# Patient Record
Sex: MALE | Race: WHITE | Employment: FULL TIME | URBAN - METROPOLITAN AREA
[De-identification: names, ages, dates, MRNs, and addresses within clinical notes are randomized per-mention and may not be internally consistent; named-entity substitution may affect disease eponyms.]

---

## 2020-03-22 ENCOUNTER — HOSPITAL ENCOUNTER (EMERGENCY)
Facility: CLINIC | Age: 65
Discharge: HOME | End: 2020-03-22
Attending: EMERGENCY MEDICINE
Payer: MEDICARE

## 2020-03-22 ENCOUNTER — APPOINTMENT (OUTPATIENT)
Dept: CT IMAGING | Facility: CLINIC | Age: 65
End: 2020-03-22
Payer: MEDICARE

## 2020-03-22 VITALS
HEIGHT: 71 IN | TEMPERATURE: 97.9 F | RESPIRATION RATE: 16 BRPM | SYSTOLIC BLOOD PRESSURE: 150 MMHG | HEART RATE: 77 BPM | DIASTOLIC BLOOD PRESSURE: 80 MMHG | WEIGHT: 260 LBS | BODY MASS INDEX: 36.4 KG/M2 | OXYGEN SATURATION: 99 %

## 2020-03-22 LAB
-: ABNORMAL
ABSOLUTE EOS #: 0.3 K/UL (ref 0–0.4)
ABSOLUTE IMMATURE GRANULOCYTE: ABNORMAL K/UL (ref 0–0.3)
ABSOLUTE LYMPH #: 2.7 K/UL (ref 1–4.8)
ABSOLUTE MONO #: 0.9 K/UL (ref 0.1–1.2)
ALBUMIN SERPL-MCNC: 3.9 G/DL (ref 3.5–5.2)
ALBUMIN/GLOBULIN RATIO: 1.1 (ref 1–2.5)
ALP BLD-CCNC: 82 U/L (ref 40–129)
ALT SERPL-CCNC: 22 U/L (ref 5–41)
AMORPHOUS: ABNORMAL
ANION GAP SERPL CALCULATED.3IONS-SCNC: 14 MMOL/L (ref 9–17)
AST SERPL-CCNC: 20 U/L
BACTERIA: ABNORMAL
BASOPHILS # BLD: 1 % (ref 0–2)
BASOPHILS ABSOLUTE: 0.1 K/UL (ref 0–0.2)
BILIRUB SERPL-MCNC: 0.4 MG/DL (ref 0.3–1.2)
BILIRUBIN URINE: NEGATIVE
BUN BLDV-MCNC: 15 MG/DL (ref 8–23)
BUN/CREAT BLD: ABNORMAL (ref 9–20)
CALCIUM SERPL-MCNC: 9.8 MG/DL (ref 8.6–10.4)
CASTS UA: ABNORMAL /LPF (ref 0–2)
CHLORIDE BLD-SCNC: 102 MMOL/L (ref 98–107)
CO2: 26 MMOL/L (ref 20–31)
COLOR: YELLOW
COMMENT UA: ABNORMAL
CREAT SERPL-MCNC: 0.9 MG/DL (ref 0.7–1.2)
CRYSTALS, UA: ABNORMAL /HPF
DIFFERENTIAL TYPE: ABNORMAL
EOSINOPHILS RELATIVE PERCENT: 3 % (ref 1–4)
EPITHELIAL CELLS UA: ABNORMAL /HPF (ref 0–5)
GFR AFRICAN AMERICAN: >60 ML/MIN
GFR NON-AFRICAN AMERICAN: >60 ML/MIN
GFR SERPL CREATININE-BSD FRML MDRD: ABNORMAL ML/MIN/{1.73_M2}
GFR SERPL CREATININE-BSD FRML MDRD: ABNORMAL ML/MIN/{1.73_M2}
GLUCOSE BLD-MCNC: 102 MG/DL (ref 70–99)
GLUCOSE URINE: NEGATIVE
HCT VFR BLD CALC: 43.1 % (ref 41–53)
HEMOGLOBIN: 13.9 G/DL (ref 13.5–17.5)
IMMATURE GRANULOCYTES: ABNORMAL %
KETONES, URINE: NEGATIVE
LEUKOCYTE ESTERASE, URINE: NEGATIVE
LIPASE: 37 U/L (ref 13–60)
LYMPHOCYTES # BLD: 24 % (ref 24–44)
MCH RBC QN AUTO: 28.6 PG (ref 26–34)
MCHC RBC AUTO-ENTMCNC: 32.1 G/DL (ref 31–37)
MCV RBC AUTO: 88.9 FL (ref 80–100)
MONOCYTES # BLD: 8 % (ref 2–11)
MUCUS: ABNORMAL
NITRITE, URINE: NEGATIVE
NRBC AUTOMATED: ABNORMAL PER 100 WBC
OTHER OBSERVATIONS UA: ABNORMAL
PDW BLD-RTO: 14.1 % (ref 12.5–15.4)
PH UA: 6 (ref 5–8)
PLATELET # BLD: 205 K/UL (ref 140–450)
PLATELET ESTIMATE: ABNORMAL
PMV BLD AUTO: 9.4 FL (ref 6–12)
POTASSIUM SERPL-SCNC: 4 MMOL/L (ref 3.7–5.3)
PROTEIN UA: ABNORMAL
RBC # BLD: 4.85 M/UL (ref 4.5–5.9)
RBC # BLD: ABNORMAL 10*6/UL
RBC UA: ABNORMAL /HPF (ref 0–2)
RENAL EPITHELIAL, UA: ABNORMAL /HPF
SEG NEUTROPHILS: 64 % (ref 36–66)
SEGMENTED NEUTROPHILS ABSOLUTE COUNT: 7.3 K/UL (ref 1.8–7.7)
SODIUM BLD-SCNC: 142 MMOL/L (ref 135–144)
SPECIFIC GRAVITY UA: 1.02 (ref 1–1.03)
TOTAL PROTEIN: 7.4 G/DL (ref 6.4–8.3)
TRICHOMONAS: ABNORMAL
TURBIDITY: CLEAR
URINE HGB: ABNORMAL
UROBILINOGEN, URINE: NORMAL
WBC # BLD: 11.2 K/UL (ref 3.5–11)
WBC # BLD: ABNORMAL 10*3/UL
WBC UA: ABNORMAL /HPF (ref 0–5)
YEAST: ABNORMAL

## 2020-03-22 PROCEDURE — 85025 COMPLETE CBC W/AUTO DIFF WBC: CPT

## 2020-03-22 PROCEDURE — 83690 ASSAY OF LIPASE: CPT

## 2020-03-22 PROCEDURE — 6360000002 HC RX W HCPCS: Performed by: EMERGENCY MEDICINE

## 2020-03-22 PROCEDURE — 36415 COLL VENOUS BLD VENIPUNCTURE: CPT

## 2020-03-22 PROCEDURE — 81001 URINALYSIS AUTO W/SCOPE: CPT

## 2020-03-22 PROCEDURE — 96374 THER/PROPH/DIAG INJ IV PUSH: CPT

## 2020-03-22 PROCEDURE — 80053 COMPREHEN METABOLIC PANEL: CPT

## 2020-03-22 PROCEDURE — 99284 EMERGENCY DEPT VISIT MOD MDM: CPT

## 2020-03-22 PROCEDURE — 74176 CT ABD & PELVIS W/O CONTRAST: CPT

## 2020-03-22 RX ORDER — TAMSULOSIN HYDROCHLORIDE 0.4 MG/1
0.4 CAPSULE ORAL DAILY
Qty: 30 CAPSULE | Refills: 0 | Status: SHIPPED | OUTPATIENT
Start: 2020-03-22 | End: 2020-10-19

## 2020-03-22 RX ORDER — KETOROLAC TROMETHAMINE 15 MG/ML
15 INJECTION, SOLUTION INTRAMUSCULAR; INTRAVENOUS ONCE
Status: COMPLETED | OUTPATIENT
Start: 2020-03-22 | End: 2020-03-22

## 2020-03-22 RX ORDER — ONDANSETRON 4 MG/1
4 TABLET, ORALLY DISINTEGRATING ORAL EVERY 8 HOURS PRN
Qty: 12 TABLET | Refills: 0 | Status: SHIPPED | OUTPATIENT
Start: 2020-03-22 | End: 2020-10-19

## 2020-03-22 RX ORDER — HYDROCODONE BITARTRATE AND ACETAMINOPHEN 5; 325 MG/1; MG/1
1 TABLET ORAL EVERY 6 HOURS PRN
Qty: 12 TABLET | Refills: 0 | Status: SHIPPED | OUTPATIENT
Start: 2020-03-22 | End: 2020-03-25

## 2020-03-22 RX ADMIN — KETOROLAC TROMETHAMINE 15 MG: 15 INJECTION, SOLUTION INTRAMUSCULAR; INTRAVENOUS at 17:34

## 2020-03-22 ASSESSMENT — PAIN SCALES - GENERAL
PAINLEVEL_OUTOF10: 4
PAINLEVEL_OUTOF10: 4

## 2020-03-22 ASSESSMENT — PAIN DESCRIPTION - ORIENTATION: ORIENTATION: LEFT;LOWER

## 2020-03-22 ASSESSMENT — PAIN DESCRIPTION - ONSET: ONSET: GRADUAL

## 2020-03-22 ASSESSMENT — PAIN DESCRIPTION - PAIN TYPE: TYPE: ACUTE PAIN

## 2020-03-22 ASSESSMENT — PAIN DESCRIPTION - DIRECTION: RADIATING_TOWARDS: BACK

## 2020-03-22 ASSESSMENT — PAIN DESCRIPTION - DESCRIPTORS: DESCRIPTORS: TENDER

## 2020-03-22 ASSESSMENT — PAIN DESCRIPTION - LOCATION: LOCATION: ABDOMEN

## 2020-03-22 ASSESSMENT — PAIN DESCRIPTION - PROGRESSION: CLINICAL_PROGRESSION: GRADUALLY WORSENING

## 2020-03-22 ASSESSMENT — PAIN DESCRIPTION - FREQUENCY: FREQUENCY: CONTINUOUS

## 2020-03-22 NOTE — ED PROVIDER NOTES
[facility]  [address] [facility]  Phone: [phone]      Pt Name: Elizabeth Jackson  TJF:[id number]  [place] 1955  Date of evaluation: 3/22/2020      CHIEF COMPLAINT       Chief Complaint   Patient presents with    Abdominal Pain     LLQ and wraps around to the back. Had a kidney stone a month ago. States his bowel movements have been much less than normal       HISTORY OF PRESENT ILLNESS   68-year-old male presents to the emergency department today complaining of left-sided abdominal pain that is been going on over the past several weeks. Seems to wax and wane in terms of severity. He reports several months ago that he had a kidney stone that he was able to pass on his own and thus somewhat resemble that. This pain stays in his left side and does not radiate anywhere. Pain on a scale of 0-10 is a 4. Seems to be worse when he lays down at night and better throughout the day. No dysuria hesitancy or frequency. No hematuria. No fevers or chills. No diarrhea. No blood in the stool. There is been no other contemporaneous evaluation or management of the symptoms prior to arrival.  About 3 weeks ago he was placed on a new diabetic medication. REVIEW OF SYSTEMS     Review of Systems   All other systems reviewed and are negative. PAST MEDICAL HISTORY    has a past medical history of Kidney stone. Diabetes    SURGICAL HISTORY      has a past surgical history that includes Tonsillectomy and Hand surgery. CURRENT MEDICATIONS       Previous Medications    No medications on file       ALLERGIES     is allergic to pcn [penicillins] and sulfa antibiotics. FAMILY HISTORY     has no family status information on file. family history is not on file. SOCIAL HISTORY      reports that he has never smoked. He has never used smokeless tobacco. He reports current alcohol use. He reports that he does not use drugs.     PHYSICAL EXAM     INITIAL VITALS:  height is 5' 11\" (1.803 m) and weight is 117.9 kg (260 lb). His oral temperature is 97.9 °F (36.6 °C). His blood pressure is 150/80 (abnormal) and his pulse is 77. His respiration is 16 and oxygen saturation is 99%. Physical Exam  Vitals signs reviewed. Constitutional:       Appearance: He is well-developed. HENT:      Head: Normocephalic and atraumatic. Eyes:      Conjunctiva/sclera: Conjunctivae normal.      Pupils: Pupils are equal, round, and reactive to light. Neck:      Musculoskeletal: Normal range of motion and neck supple. Trachea: No tracheal deviation. Cardiovascular:      Rate and Rhythm: Normal rate and regular rhythm. Pulmonary:      Effort: Pulmonary effort is normal.      Breath sounds: Normal breath sounds. Abdominal:      General: Bowel sounds are normal. There is no distension. Palpations: Abdomen is soft. Tenderness: There is abdominal tenderness. There is no guarding or rebound. Comments: Patient is exclusively tender left lower quadrant without any surgical findings. Musculoskeletal: Normal range of motion. General: No tenderness. Skin:     General: Skin is warm and dry. Findings: No rash. Neurological:      Mental Status: He is alert and oriented to person, place, and time. Psychiatric:         Behavior: Behavior normal.         Thought Content: Thought content normal.         Judgment: Judgment normal.           DIFFERENTIAL DIAGNOSIS/ MDM:   Patient will be evaluated for diverticulitis as well as ureterolithiasis. DIAGNOSTIC RESULTS     EKG:  None    RADIOLOGY:   Ct Abdomen Pelvis Wo Contrast Additional Contrast? None    Result Date: 3/22/2020  EXAMINATION: CT OF THE ABDOMEN AND PELVIS WITHOUT CONTRAST 3/22/2020 4:40 pm TECHNIQUE: CT of the abdomen and pelvis was performed without the administration of intravenous contrast. Multiplanar reformatted images are provided for review.  Dose modulation, iterative reconstruction, and/or weight based adjustment of definitive evaluation and management. CONSULTS:  None    CRITICAL CARE:   None    PROCEDURES:  None    FINAL IMPRESSION      1.  Hydronephrosis with urinary obstruction due to ureteral calculus          DISPOSITION/PLAN   DISPOSITION Decision To Discharge 03/22/2020 06:31:11 PM      Condition on Disposition  Good    PATIENT REFERRED TO:  Beth Valenzuela MD  [address] [place] [address] [phone]    Schedule an appointment as soon as possible for a visit in 2 days        DISCHARGE MEDICATIONS:  [unfilled]    (Please note that portions of this note were completed with a voice recognitionprogram.  Efforts were made to edit the dictations but occasionally words are mis-transcribed.)    Alexei Orourke MD, F.A.C.E.P, F.A.A.E.M  Emergency Physician Attending          Alexei Orourke MD  03/22/20 6200

## 2020-05-11 ENCOUNTER — HOSPITAL ENCOUNTER (OUTPATIENT)
Dept: GENERAL RADIOLOGY | Facility: CLINIC | Age: 65
Discharge: HOME | End: 2020-05-13
Payer: COMMERCIAL

## 2020-05-11 ENCOUNTER — HOSPITAL ENCOUNTER (OUTPATIENT)
Facility: CLINIC | Age: 65
Discharge: HOME | End: 2020-05-13
Payer: COMMERCIAL

## 2020-05-11 PROCEDURE — 74018 RADEX ABDOMEN 1 VIEW: CPT

## 2020-10-19 ENCOUNTER — HOSPITAL ENCOUNTER (EMERGENCY)
Facility: CLINIC | Age: 65
Discharge: HOME | End: 2020-10-19
Attending: EMERGENCY MEDICINE
Payer: MEDICARE

## 2020-10-19 ENCOUNTER — APPOINTMENT (OUTPATIENT)
Dept: CT IMAGING | Facility: CLINIC | Age: 65
End: 2020-10-19
Payer: MEDICARE

## 2020-10-19 VITALS
BODY MASS INDEX: 35.21 KG/M2 | HEART RATE: 67 BPM | DIASTOLIC BLOOD PRESSURE: 86 MMHG | TEMPERATURE: 98 F | OXYGEN SATURATION: 99 % | HEIGHT: 72 IN | SYSTOLIC BLOOD PRESSURE: 177 MMHG | WEIGHT: 260 LBS | RESPIRATION RATE: 16 BRPM

## 2020-10-19 LAB
-: ABNORMAL
AMORPHOUS: ABNORMAL
BACTERIA: ABNORMAL
BILIRUBIN URINE: NEGATIVE
CASTS UA: ABNORMAL /LPF (ref 0–2)
COLOR: YELLOW
COMMENT UA: ABNORMAL
CRYSTALS, UA: ABNORMAL /HPF
EPITHELIAL CELLS UA: ABNORMAL /HPF (ref 0–5)
GLUCOSE URINE: ABNORMAL
KETONES, URINE: NEGATIVE
LEUKOCYTE ESTERASE, URINE: NEGATIVE
MUCUS: ABNORMAL
NITRITE, URINE: NEGATIVE
OTHER OBSERVATIONS UA: ABNORMAL
PH UA: 5 (ref 5–8)
PROTEIN UA: ABNORMAL
RBC UA: ABNORMAL /HPF (ref 0–2)
RENAL EPITHELIAL, UA: ABNORMAL /HPF
SPECIFIC GRAVITY UA: 1.02 (ref 1–1.03)
TRICHOMONAS: ABNORMAL
TURBIDITY: CLEAR
URINE HGB: ABNORMAL
UROBILINOGEN, URINE: NORMAL
WBC UA: ABNORMAL /HPF (ref 0–5)
YEAST: ABNORMAL

## 2020-10-19 PROCEDURE — 99283 EMERGENCY DEPT VISIT LOW MDM: CPT

## 2020-10-19 PROCEDURE — 81001 URINALYSIS AUTO W/SCOPE: CPT

## 2020-10-19 PROCEDURE — 74176 CT ABD & PELVIS W/O CONTRAST: CPT

## 2020-10-19 PROCEDURE — 99282 EMERGENCY DEPT VISIT SF MDM: CPT

## 2020-10-19 RX ORDER — KETOROLAC TROMETHAMINE 10 MG/1
TABLET, FILM COATED ORAL
COMMUNITY
End: 2022-07-26

## 2020-10-19 RX ORDER — SITAGLIPTIN AND METFORMIN HYDROCHLORIDE 1000; 50 MG/1; MG/1
TABLET, FILM COATED, EXTENDED RELEASE ORAL
COMMUNITY

## 2020-10-19 RX ORDER — ATORVASTATIN CALCIUM 20 MG/1
TABLET, FILM COATED ORAL
COMMUNITY

## 2020-10-19 RX ORDER — IRBESARTAN 150 MG/1
TABLET ORAL
COMMUNITY

## 2020-10-19 NOTE — ED PROVIDER NOTES
[address] [facility]      Pt Name: Ulices Bo  MRN: [id number]  [place] 1955  Date of evaluation: 10/19/2020      CHIEF COMPLAINT       Chief Complaint   Patient presents with    Flank Pain         HISTORY OF PRESENT ILLNESS      The patient presents with left flank pain. Had a little bit last night, but it got worse this morning. He has a history of kidney stones. His last one was 9 mm but seems to have passed on its own. This was in March. He has an appointment to see his PCP for a physical today. His doctor wanted him to get a CT scan. The patient declines medicine for pain or nausea at this time. He had a little nausea earlier but it has passed. He says the pain was in his left leg but now is more toward his abdomen. Nothing makes his symptoms better or worse otherwise. REVIEW OF SYSTEMS       All systems reviewed and negative unless noted in HPI. The patient denies fever or constitutional symptoms. Denies vision change. Denies any sore throat or rhinorrhea. Denies any neck pain or stiffness. Denies chest pain or shortness of breath. Nausea earlier today. Left flank pain; denies dysuria; history of kidney stones. Denies musculoskeletal injury or pain. Denies any weakness, numbness or focal neurologic deficit. Denies any skin rash or edema. No recent psychiatric issues. No easy bruising or bleeding. History of DM. PAST MEDICAL HISTORY    has a past medical history of Kidney stone. SURGICAL HISTORY      has a past surgical history that includes Tonsillectomy and Hand surgery.     CURRENT MEDICATIONS       Previous Medications    ATORVASTATIN (LIPITOR) 20 MG TABLET    1 tablet    IRBESARTAN (AVAPRO) 150 MG TABLET    TAKE 1 TABLET BY MOUTH ONE TIME A DAY    KETOROLAC (TORADOL) 10 MG TABLET    1 tablet with food or milk as needed    SITAGLIPTIN-METFORMIN (JANUMET XR)  MG TB24 PER EXTENDED RELEASE TABLET    2 Tabs daily Orally 30 day(s)       ALLERGIES     is allergic to pcn [penicillins] and sulfa antibiotics. FAMILY HISTORY     has no family status information on file. family history is not on file. SOCIAL HISTORY      reports that he has never smoked. He has never used smokeless tobacco. He reports current alcohol use. He reports that he does not use drugs. PHYSICAL EXAM     INITIAL VITALS:  height is 6' (1.829 m) and weight is 117.9 kg (260 lb). His oral temperature is 98 °F (36.7 °C). His blood pressure is 177/86 (abnormal) and his pulse is 67. His respiration is 16 and oxygen saturation is 99%. The patient is alert and oriented, in no apparent distress. HEENT is atraumatic. Pupils are PERRL at 4 mm with normal extraocular motion. Mucous membranes moist.    Neck is supple. Heart sounds regular rate and rhythm with no gallops, murmurs, or rubs. Lungs clear, no wheezes, rales or rhonchi. Abdomen: soft, nontender with no pain to palpation. No pulsatile mass. Normal bowel sounds are noted. No rebound or guarding. No CVA tenderness. .  Musculoskeletal exam shows no evidence of trauma. Normal distal pulses in all extremities. Skin: no rash or edema. Neurological exam reveals cranial nerves 2 through 12 grossly intact. Patient has equal  and normal deep tendon reflexes. Psychiatric:  Appropriate. Lymphatics.:  No lymphadenopathy. DIFFERENTIAL DIAGNOSIS/ MDM:     Renal colic, UTI    DIAGNOSTIC RESULTS         RADIOLOGY:   I reviewed the radiologist interpretations:  CT ABDOMEN PELVIS WO CONTRAST   Final Result   Left-sided hydronephrosis with a 6 mm stone at the left ureteropelvic   junction. Nonobstructing left renal stone.               CT ABDOMEN PELVIS WO CONTRAST (Final result)   Result time 10/19/20 10:12:46   Final result by Kristin Wang MD (10/19/20 10:12:46)                 Impression:     Left-sided hydronephrosis with a 6 mm stone at the left ureteropelvic   junction. Nonobstructing left renal stone. Narrative:     EXAMINATION:   CT OF THE ABDOMEN AND PELVIS WITHOUT CONTRAST 10/19/2020 10:01 am     TECHNIQUE:   CT of the abdomen and pelvis was performed without the administration of   intravenous contrast. Multiplanar reformatted images are provided for review. Dose modulation, iterative reconstruction, and/or weight based adjustment of   the mA/kV was utilized to reduce the radiation dose to as low as reasonably   achievable. COMPARISON:   CT abdomen and pelvis performed 03/22/2020. HISTORY:   ORDERING SYSTEM PROVIDED HISTORY: Pain   TECHNOLOGIST PROVIDED HISTORY:   Pain   Reason for Exam: llq pain, hx kidney stones,   Acuity: Acute   Type of Exam: Initial   Relevant Medical/Surgical History: previous hx stones, no hx cancer, no   surgeries     FINDINGS:   Lower Chest: The lung bases are without consolidation or effusion.  The   visualized cardiac structures are unremarkable. Organs: The liver and spleen are normal size and overall attenuation.  The   gallbladder, pancreas, and adrenal glands are unremarkable.  The right kidney   is without obstructive uropathy. Dewitte Peto is left-sided hydronephrosis with a 6   mm stone at the ureteropelvic junction.  There is a nonobstructing left renal   stone measuring approximately 3 mm.  The ureters are not dilated.  The   urinary bladder is unremarkable. GI/Bowel: The stomach is unremarkable.  Loops of small bowel are normal in   caliber without evidence for obstruction.  The colon contains air and fecal   residue and is otherwise unremarkable.  The appendix is normal.  There is no   intraperitoneal free air or free fluid. Pelvis: The prostate gland and seminal vesicles are unremarkable. Phleboliths are seen in the pelvis.      Peritoneum/Retroperitoneum: The psoas muscles are symmetric.  The abdominal   aorta is normal in caliber.  The inferior vena cava is Resp: 16      Re-evaluation Notes    The patient has a ureteral stone but no evidence of infection. He would like to continue Toradol. He may follow-up with the urologist as needed. He is discharged in good condition. FINAL IMPRESSION      1.  Ureterolithiasis          DISPOSITION/PLAN   DISPOSITION Decision To Discharge 10/19/2020 10:18:58 AM      Condition on Disposition    good    PATIENT REFERRED TO:  Michael Burk MD  [address] [facility] [phone]    In 1 week  As needed      DISCHARGE MEDICATIONS:  New Prescriptions    No medications on file       (Please note that portions of this note were completed with a voice recognition program.  Efforts were made to edit the dictations but occasionally words are mis-transcribed.)    Stephany Bamberger,, MD   Attending Emergency Physician       Philippa Holstein, MD  10/19/20 9206

## 2020-10-23 ENCOUNTER — APPOINTMENT (OUTPATIENT)
Dept: GENERAL RADIOLOGY | Facility: CLINIC | Age: 65
End: 2020-10-23
Payer: MEDICARE

## 2020-10-23 ENCOUNTER — HOSPITAL ENCOUNTER (EMERGENCY)
Facility: CLINIC | Age: 65
Discharge: HOME | End: 2020-10-23
Attending: EMERGENCY MEDICINE
Payer: MEDICARE

## 2020-10-23 VITALS
HEART RATE: 88 BPM | DIASTOLIC BLOOD PRESSURE: 82 MMHG | HEIGHT: 72 IN | BODY MASS INDEX: 35.21 KG/M2 | SYSTOLIC BLOOD PRESSURE: 153 MMHG | OXYGEN SATURATION: 97 % | WEIGHT: 260 LBS | RESPIRATION RATE: 16 BRPM | TEMPERATURE: 98.8 F

## 2020-10-23 PROCEDURE — 74019 RADEX ABDOMEN 2 VIEWS: CPT

## 2020-10-23 PROCEDURE — 99285 EMERGENCY DEPT VISIT HI MDM: CPT

## 2020-10-23 ASSESSMENT — PAIN DESCRIPTION - FREQUENCY: FREQUENCY: INTERMITTENT

## 2020-10-23 ASSESSMENT — PAIN DESCRIPTION - LOCATION: LOCATION: ABDOMEN

## 2020-10-23 ASSESSMENT — PAIN SCALES - GENERAL
PAINLEVEL_OUTOF10: 0
PAINLEVEL_OUTOF10: 8

## 2020-10-23 ASSESSMENT — PAIN DESCRIPTION - DESCRIPTORS: DESCRIPTORS: CRAMPING

## 2020-10-23 NOTE — ED PROVIDER NOTES
[facility] ED  [address] [facility]  Phone: [phone]  EMERGENCY DEPARTMENT ENCOUNTER      Pt Name: Chelle Lau  MRN: [id number]  [place] 1955  Date of evaluation: 10/23/2020    CHIEF COMPLAINT       Chief Complaint   Patient presents with    Constipation       HISTORY OF PRESENT ILLNESS    Tashi Flowers is a 76 y.o. male who presents to the emergency department with constipation. Last good bowel movement was several days ago he was diagnosed with a kidney stone at that time and placed on ketorolac. He is tried MiraLAX without relief. He denies any other symptoms. He says his abdomen will tighten up and then release he says it feels soft now. REVIEW OF SYSTEMS       Constitutional: No fevers or chills   HEENT: No sore throat, rhinorrhea, or earache   Eyes: No blurry vision or double vision no drainage   Cardiovascular: No chest pain or tachycardia   Respiratory: No wheezing or shortness of breath no cough   Gastrointestinal: No nausea, vomiting, diarrhea, positive constipation and abdominal cramping  : No hematuria or dysuria   Musculoskeletal: No swelling or pain   Skin: No rash   Neurological: No focal neurologic complaints, paresthesias, weakness, or headache     PAST MEDICAL HISTORY    has a past medical history of Kidney stone. SURGICAL HISTORY      has a past surgical history that includes Tonsillectomy and Hand surgery. CURRENT MEDICATIONS       Previous Medications    ATORVASTATIN (LIPITOR) 20 MG TABLET    1 tablet    IRBESARTAN (AVAPRO) 150 MG TABLET    TAKE 1 TABLET BY MOUTH ONE TIME A DAY    KETOROLAC (TORADOL) 10 MG TABLET    1 tablet with food or milk as needed    SITAGLIPTIN-METFORMIN (JANUMET XR)  MG TB24 PER EXTENDED RELEASE TABLET    2 Tabs daily Orally 30 day(s)       ALLERGIES     is allergic to pcn [penicillins] and sulfa antibiotics. FAMILY HISTORY     has no family status information on file.       family history is not on file.    SOCIAL HISTORY      reports that he has never smoked. He has never used smokeless tobacco. He reports current alcohol use. He reports that he does not use drugs. PHYSICAL EXAM       ED Triage Vitals [10/23/20 0053]   BP Temp Temp Source Pulse Resp SpO2 Height Weight   (!) 155/86 98.8 °F (37.1 °C) Oral 83 16 98 % 6' (1.829 m) 260 lb (117.9 kg)         Constitutional: Alert, oriented x3, nontoxic, answering questions appropriately, acting properly for age, in no acute distress   HEENT: Extraocular muscles intact  Neck: Trachea midline   Cardiovascular: Regular rhythm and rate no S3, S4, or murmurs   Respiratory: Clear to auscultation bilaterally no wheezes, rhonchi, rales, no respiratory distress no tachypnea no retractions no hypoxia  Gastrointestinal: Soft, nontender, nondistended, diminished bowel sounds. No rebound, rigidity, or guarding. No abdominal bruit no pulsatile mass  Musculoskeletal: No extremity pain or swelling   Neurologic: Moving all 4 extremities without difficulty there are no gross focal neurologic deficits   Skin: Warm and dry     DIFFERENTIAL DIAGNOSIS/ MDM:     X-ray and enema    DIAGNOSTIC RESULTS     EKG: All EKG's are interpreted by the Emergency Department Physician who either signs or Co-signs this chart in the absence of a cardiologist.        Not indicated unless otherwise documented above    LABS:  No results found for this visit on 10/23/20. Not indicated unless otherwise documented above    RADIOLOGY:   I reviewed the radiologist interpretations:    XR ABDOMEN (2 VIEWS)   Final Result   Nonobstructive bowel gas pattern. Moderate colonic stool. No   intraperitoneal free air. Not indicated unless otherwise documented above    EMERGENCY DEPARTMENT COURSE:     The patient was given the following medications:  No orders of the defined types were placed in this encounter.        Vitals:   -------------------------  BP (!) 155/86   Pulse 83   Temp 98.8 °F (37.1 °C) (Oral)   Resp 16   Ht 6' (1.829 m)   Wt 117.9 kg (260 lb)   SpO2 98%   BMI 35.26 kg/m²     X-ray showed moderate amount of stool was given enema and had successful results will discharge. Abdomen soft nonsurgical.  Increase fluids and fiber follow-up with family physician return if worsening symptoms or any other concerns. I have reviewed the disposition diagnosis with the patient and or their family/guardian. I have answered their questions and given discharge instructions. They voiced understanding of these instructions and did not have any furtherquestions or complaints. CRITICAL CARE:    None    CONSULTS:    None    PROCEDURES:    None      OARRS Report if indicated             FINAL IMPRESSION      1.  Constipation, unspecified constipation type          DISPOSITION/PLAN   DISPOSITION Decision To Discharge 10/23/2020 03:05:02 AM        CONDITION ON DISPOSITION: STABLE       PATIENT REFERRED TO:  Tamara Santos 38 [address] [facility]  [phone]    Schedule an appointment as soon as possible for a visit in 3 days        DISCHARGE MEDICATIONS:  New Prescriptions    No medications on file       (Please note that portions of thisnote were completed with a voice recognition program.  Efforts were made to edit the dictations but occasionally words are mis-transcribed.)    Sebastian Caceres,,   Attending Emergency Physician        Cristo Gaviria  10/23/20 1113

## 2022-07-23 ENCOUNTER — HOSPITAL ENCOUNTER (EMERGENCY)
Facility: CLINIC | Age: 67
Discharge: HOME | End: 2022-07-23
Attending: EMERGENCY MEDICINE
Payer: MEDICARE

## 2022-07-23 ENCOUNTER — APPOINTMENT (OUTPATIENT)
Dept: CT IMAGING | Facility: CLINIC | Age: 67
End: 2022-07-23
Payer: MEDICARE

## 2022-07-23 VITALS
SYSTOLIC BLOOD PRESSURE: 155 MMHG | OXYGEN SATURATION: 98 % | HEIGHT: 71 IN | HEART RATE: 74 BPM | BODY MASS INDEX: 37.8 KG/M2 | TEMPERATURE: 98.5 F | RESPIRATION RATE: 18 BRPM | DIASTOLIC BLOOD PRESSURE: 91 MMHG | WEIGHT: 270 LBS

## 2022-07-23 DIAGNOSIS — N20.1 LEFT URETERAL STONE: Primary | ICD-10-CM

## 2022-07-23 LAB
-: ABNORMAL
ABSOLUTE EOS #: 0.22 K/UL (ref 0–0.4)
ABSOLUTE LYMPH #: 2 K/UL (ref 1–4.8)
ABSOLUTE MONO #: 0.67 K/UL (ref 0.1–0.8)
ALBUMIN SERPL-MCNC: 4.1 G/DL (ref 3.5–5.2)
ALBUMIN/GLOBULIN RATIO: 1.5 (ref 1–2.5)
ALP BLD-CCNC: 93 U/L (ref 40–129)
ALT SERPL-CCNC: 28 U/L (ref 5–41)
ANION GAP SERPL CALCULATED.3IONS-SCNC: 11 MMOL/L (ref 9–17)
AST SERPL-CCNC: 32 U/L
BACTERIA: ABNORMAL
BASOPHILS # BLD: 1 % (ref 0–2)
BASOPHILS ABSOLUTE: 0.11 K/UL (ref 0–0.2)
BILIRUB SERPL-MCNC: 0.4 MG/DL (ref 0.3–1.2)
BILIRUBIN URINE: ABNORMAL
BUN BLDV-MCNC: 20 MG/DL (ref 8–23)
CALCIUM SERPL-MCNC: 8.8 MG/DL (ref 8.6–10.4)
CHLORIDE BLD-SCNC: 103 MMOL/L (ref 98–107)
CO2: 24 MMOL/L (ref 20–31)
COLOR: YELLOW
CREAT SERPL-MCNC: 1.2 MG/DL (ref 0.7–1.2)
CRYSTALS, UA: ABNORMAL /HPF
EOSINOPHILS RELATIVE PERCENT: 2 % (ref 1–4)
EPITHELIAL CELLS UA: ABNORMAL /HPF (ref 0–5)
GFR AFRICAN AMERICAN: >60 ML/MIN
GFR NON-AFRICAN AMERICAN: >60 ML/MIN
GFR SERPL CREATININE-BSD FRML MDRD: ABNORMAL ML/MIN/{1.73_M2}
GLUCOSE BLD-MCNC: 237 MG/DL (ref 70–99)
GLUCOSE URINE: ABNORMAL
HCT VFR BLD CALC: 43.8 % (ref 41–53)
HEMOGLOBIN: 14 G/DL (ref 13.5–17.5)
KETONES, URINE: NEGATIVE
LEUKOCYTE ESTERASE, URINE: NEGATIVE
LYMPHOCYTES # BLD: 18 % (ref 24–44)
MCH RBC QN AUTO: 28.2 PG (ref 26–34)
MCHC RBC AUTO-ENTMCNC: 32 G/DL (ref 31–37)
MCV RBC AUTO: 88.1 FL (ref 80–100)
MONOCYTES # BLD: 6 % (ref 1–7)
MORPHOLOGY: ABNORMAL
MUCUS: ABNORMAL
NITRITE, URINE: NEGATIVE
OTHER OBSERVATIONS UA: ABNORMAL
PDW BLD-RTO: 14.9 % (ref 12.5–15.4)
PH UA: 5 (ref 5–8)
PLATELET # BLD: 184 K/UL (ref 140–450)
PMV BLD AUTO: 9.1 FL (ref 6–12)
POTASSIUM SERPL-SCNC: 4.6 MMOL/L (ref 3.7–5.3)
PROTEIN UA: ABNORMAL
RBC # BLD: 4.97 M/UL (ref 4.5–5.9)
RBC UA: ABNORMAL /HPF (ref 0–2)
SEG NEUTROPHILS: 73 % (ref 36–66)
SEGMENTED NEUTROPHILS ABSOLUTE COUNT: 8.1 K/UL (ref 1.8–7.7)
SODIUM BLD-SCNC: 138 MMOL/L (ref 135–144)
SPECIFIC GRAVITY UA: 1.03 (ref 1–1.03)
TOTAL PROTEIN: 6.9 G/DL (ref 6.4–8.3)
TURBIDITY: ABNORMAL
URINE HGB: ABNORMAL
UROBILINOGEN, URINE: NORMAL
WBC # BLD: 11.1 K/UL (ref 3.5–11)
WBC UA: ABNORMAL /HPF (ref 0–5)

## 2022-07-23 PROCEDURE — 85025 COMPLETE CBC W/AUTO DIFF WBC: CPT

## 2022-07-23 PROCEDURE — 2580000003 HC RX 258: Performed by: EMERGENCY MEDICINE

## 2022-07-23 PROCEDURE — 99284 EMERGENCY DEPT VISIT MOD MDM: CPT

## 2022-07-23 PROCEDURE — 36415 COLL VENOUS BLD VENIPUNCTURE: CPT

## 2022-07-23 PROCEDURE — 80053 COMPREHEN METABOLIC PANEL: CPT

## 2022-07-23 PROCEDURE — 81001 URINALYSIS AUTO W/SCOPE: CPT

## 2022-07-23 PROCEDURE — 96374 THER/PROPH/DIAG INJ IV PUSH: CPT

## 2022-07-23 PROCEDURE — 74176 CT ABD & PELVIS W/O CONTRAST: CPT

## 2022-07-23 PROCEDURE — 6360000002 HC RX W HCPCS: Performed by: EMERGENCY MEDICINE

## 2022-07-23 RX ORDER — ONDANSETRON 4 MG/1
4 TABLET, ORALLY DISINTEGRATING ORAL EVERY 8 HOURS PRN
Qty: 10 TABLET | Refills: 0 | Status: SHIPPED | OUTPATIENT
Start: 2022-07-23

## 2022-07-23 RX ORDER — OXYCODONE HYDROCHLORIDE AND ACETAMINOPHEN 5; 325 MG/1; MG/1
1 TABLET ORAL EVERY 8 HOURS PRN
Qty: 5 TABLET | Refills: 0 | Status: ON HOLD | OUTPATIENT
Start: 2022-07-23 | End: 2022-07-28 | Stop reason: HOSPADM

## 2022-07-23 RX ORDER — 0.9 % SODIUM CHLORIDE 0.9 %
1000 INTRAVENOUS SOLUTION INTRAVENOUS ONCE
Status: COMPLETED | OUTPATIENT
Start: 2022-07-23 | End: 2022-07-23

## 2022-07-23 RX ORDER — KETOROLAC TROMETHAMINE 15 MG/ML
15 INJECTION, SOLUTION INTRAMUSCULAR; INTRAVENOUS ONCE
Status: COMPLETED | OUTPATIENT
Start: 2022-07-23 | End: 2022-07-23

## 2022-07-23 RX ADMIN — SODIUM CHLORIDE 1000 ML: 9 INJECTION, SOLUTION INTRAVENOUS at 05:14

## 2022-07-23 RX ADMIN — KETOROLAC TROMETHAMINE 15 MG: 15 INJECTION, SOLUTION INTRAMUSCULAR; INTRAVENOUS at 05:15

## 2022-07-23 ASSESSMENT — PAIN DESCRIPTION - FREQUENCY: FREQUENCY: INTERMITTENT

## 2022-07-23 ASSESSMENT — PAIN DESCRIPTION - DIRECTION: RADIATING_TOWARDS: LEFT UPPER ABD

## 2022-07-23 ASSESSMENT — PAIN DESCRIPTION - LOCATION: LOCATION: FLANK

## 2022-07-23 ASSESSMENT — PAIN SCALES - GENERAL
PAINLEVEL_OUTOF10: 6
PAINLEVEL_OUTOF10: 0
PAINLEVEL_OUTOF10: 3

## 2022-07-23 ASSESSMENT — PAIN DESCRIPTION - PAIN TYPE: TYPE: ACUTE PAIN

## 2022-07-23 ASSESSMENT — PAIN DESCRIPTION - ONSET: ONSET: ON-GOING

## 2022-07-23 ASSESSMENT — PAIN DESCRIPTION - ORIENTATION: ORIENTATION: LEFT

## 2022-07-23 ASSESSMENT — PAIN - FUNCTIONAL ASSESSMENT: PAIN_FUNCTIONAL_ASSESSMENT: 0-10

## 2022-07-23 NOTE — DISCHARGE INSTRUCTIONS
Follow up with your urologist or in the Urology Clinic - call for an appointment. Take your Flomax once daily for 5 days. For pain use acetaminophen (Tylenol) or ibuprofen (Motrin / Advil), unless prescribed medications that have acetaminophen or ibuprofen (or similar medications) in it. Do not take Toradol if you are taking ibuporfen and vice versa. Do not take Toradol for over 3 days in a row. Take the Percocet for break through pain. Drink plenty of water. Strain your urine for any stones (collect the stones and take them with you to the urologist    [address] for worsening symptoms, inability to urinate, worsening of blood in your urine, or if you develop any concerning symptoms such as: high fever not relieved by acetaminophen (Tylenol) and/or ibuprofen (Motrin / Advil), chills, shortness of breath, chest pain, feeling of your heart fluttering or racing, persistent nausea and/or vomiting, vomiting up blood, blood in your stool, numbness, loss of consciousness, weakness or tingling in the arms or legs or change in color of the extremities, changes in mental status, persistent headache, blurry vision, loss of bladder / bowel control, unable to follow up with your physician, or other any other care or concern.

## 2022-07-23 NOTE — ED NOTES
Pt ambulatory to room # 11 from waiting area, gait steady. Pt. Rehana Pascal left flank pain since Thursday. Pt. States he does have a hx of kidney stones. Pt. Denies dysuria. Pt took 10mg toradol PO around 2100 tonight. Pt. Alert and oriented x4. RR equal and non labored. NAD noted. Call light within reach.      Nikolai Stewart RN  07/23/22 3090

## 2022-07-23 NOTE — ED PROVIDER NOTES
has a past medical history of Diabetes mellitus ([facility] [address] Hypertension, and Kidney stone. SURGICAL HISTORY      has a past surgical history that includes Tonsillectomy and Hand surgery. CURRENT MEDICATIONS       Previous Medications    ATORVASTATIN (LIPITOR) 20 MG TABLET    1 tablet    IRBESARTAN (AVAPRO) 150 MG TABLET    TAKE 1 TABLET BY MOUTH ONE TIME A DAY    KETOROLAC (TORADOL) 10 MG TABLET    1 tablet with food or milk as needed    SITAGLIPTIN-METFORMIN (JANUMET XR)  MG TB24 PER EXTENDED RELEASE TABLET    2 Tabs daily Orally 30 day(s)       ALLERGIES     is allergic to pcn [penicillins] and sulfa antibiotics. FAMILY HISTORY     has no family status information on file. family history is not on file. SOCIAL HISTORY      reports that he has never smoked. He has never used smokeless tobacco. He reports current alcohol use. He reports that he does not use drugs. PHYSICAL EXAM     INITIAL VITALS:  height is 5' 11\" (1.803 m) and weight is 122.5 kg (270 lb). His oral temperature is 98.5 °F (36.9 °C). His blood pressure is 155/91 (abnormal) and his pulse is 74. His respiration is 18 and oxygen saturation is 98%. CONSTITUTIONAL: no apparent distress, well appearing  SKIN: warm, dry, no jaundice, hives or petechiae  EYES: clear conjunctiva, non-icteric sclera  HENT: normocephalic, atraumatic, moist mucus membranes  NECK: Nontender and supple with no nuchal rigidity, full range of motion  PULMONARY: clear to auscultation without wheezes, rhonchi, or rales, normal excursion, no accessory muscle use and no stridor  CARDIOVASCULAR: regular rate, rhythm. Strong radial pulses with intact distal perfusion. Capillary refill <2 seconds.   GASTROINTESTINAL: soft, large abdomen, mildly tender over the left side of the abdomen, no pain or McBurney's point, negative Lyman sign, no pulsatile mass, non-distended, no palpable masses, no rebound or guarding   GENITOURINARY: Left costovertebral angle tenderness to palpation. No right CVA tenderness to palpation. MUSCULOSKELETAL: No midline spinal tenderness, step off or deformity. Extremities are otherwise nontender to palpation and nonerythematous. Compartments soft. No peripheral edema. NEUROLOGIC: alert and oriented x 3, GCS 15, normal mentation and speech. Moves all extremities x 4 without motor or sensory deficit, gait is stable without ataxia  PSYCHIATRIC: normal mood and affect, thought process is clear and linear    DIAGNOSTIC RESULTS     EKG:  None    RADIOLOGY:   CT ABDOMEN PELVIS WO CONTRAST Additional Contrast? None    Result Date: 7/23/2022  EXAMINATION: CT OF THE ABDOMEN AND PELVIS WITHOUT CONTRAST 7/23/2022 5:10 am TECHNIQUE: CT of the abdomen and pelvis was performed without the administration of intravenous contrast. Multiplanar reformatted images are provided for review. Automated exposure control, iterative reconstruction, and/or weight based adjustment of the mA/kV was utilized to reduce the radiation dose to as low as reasonably achievable. COMPARISON: 10/19/2020 HISTORY: ORDERING SYSTEM PROVIDED HISTORY: left flank pain, constipation, h/o kidney stones TECHNOLOGIST PROVIDED HISTORY: left flank pain, constipation, h/o kidney stones Decision Support Exception - unselect if not a suspected or confirmed emergency medical condition->Emergency Medical Condition (MA) Reason for Exam: Left flank pain. Hx kidney Stones FINDINGS: LOWER CHEST: No acute findings. Sequela of old granulomatous disease. KIDNEYS AND URINARY TRACT: Obstructing 7 mm stone in the mid left ureter with mild perinephric stranding. There is an adjacent punctate stone along the superior margin of this ureteral stone as well. Bilateral punctate nephrolithiasis. ORGANS: Lack of intravenous contrast limits evaluation of the solid organs and bowel. The liver, gallbladder, pancreas, spleen, and adrenals reveal no acute abnormality.  GI/BOWEL: No bowel dilatation or wall thickening. Normal appendix. PELVIS: No acute findings. PERITONEUM/RETROPERITONEUM: No lymphadenopathy is noted. No free air or free fluid. The aorta is normal in caliber. BONES/SOFT TISSUES: No acute osseous abnormality is identified. 1.  Two adjacent mildly obstructing stones in the mid left ureter measuring up to 7 mm with mild perinephric stranding. 2.  Bilateral nephrolithiasis.         LABS:  Results for orders placed or performed during the hospital encounter of 07/23/22   CBC with Auto Differential   Result Value Ref Range    WBC 11.1 (H) 3.5 - 11.0 k/uL    RBC 4.97 4.5 - 5.9 m/uL    Hemoglobin 14.0 13.5 - 17.5 g/dL    Hematocrit 43.8 41 - 53 %    MCV 88.1 80 - 100 fL    MCH 28.2 26 - 34 pg    MCHC 32.0 31 - 37 g/dL    RDW 14.9 12.5 - 15.4 %    Platelets 079 159 - 948 k/uL    MPV 9.1 6.0 - 12.0 fL    Seg Neutrophils 73 (H) 36 - 66 %    Lymphocytes 18 (L) 24 - 44 %    Monocytes 6 1 - 7 %    Eosinophils % 2 1 - 4 %    Basophils 1 0 - 2 %    Segs Absolute 8.10 (H) 1.8 - 7.7 k/uL    Absolute Lymph # 2.00 1.0 - 4.8 k/uL    Absolute Mono # 0.67 0.1 - 0.8 k/uL    Absolute Eos # 0.22 0.0 - 0.4 k/uL    Basophils Absolute 0.11 0.0 - 0.2 k/uL    Morphology ANISOCYTOSIS PRESENT    CMP   Result Value Ref Range    Glucose 237 (H) 70 - 99 mg/dL    BUN 20 8 - 23 mg/dL    Creatinine 1.20 0.70 - 1.20 mg/dL    Calcium 8.8 8.6 - 10.4 mg/dL    Sodium 138 135 - 144 mmol/L    Potassium 4.6 3.7 - 5.3 mmol/L    Chloride 103 98 - 107 mmol/L    CO2 24 20 - 31 mmol/L    Anion Gap 11 9 - 17 mmol/L    Alkaline Phosphatase 93 40 - 129 U/L    ALT 28 5 - 41 U/L    AST 32 <40 U/L    Total Bilirubin 0.40 0.3 - 1.2 mg/dL    Total Protein 6.9 6.4 - 8.3 g/dL    Albumin 4.1 3.5 - 5.2 g/dL    Albumin/Globulin Ratio 1.5 1.0 - 2.5    GFR Non-African American >60 >60 mL/min    GFR African American >60 >60 mL/min    GFR Comment         Urinalysis with Reflex to Culture    Specimen: Urine, clean catch   Result Value Ref Range    Color, UA Yellow Yellow    Turbidity UA SLIGHTLY CLOUDY (A) Clear    Glucose, Ur 1+ (A) NEGATIVE    Bilirubin Urine NEGATIVE  Verified by ictotest. (A) NEGATIVE    Ketones, Urine NEGATIVE NEGATIVE    Specific Gravity, UA 1.033 (H) 1.005 - 1.030    Urine Hgb LARGE (A) NEGATIVE    pH, UA 5.0 5.0 - 8.0    Protein, UA 2+ (A) NEGATIVE    Urobilinogen, Urine Normal Normal    Nitrite, Urine NEGATIVE NEGATIVE    Leukocyte Esterase, Urine NEGATIVE NEGATIVE   Microscopic Urinalysis   Result Value Ref Range    -          WBC, UA 0 TO 2 0 - 5 /HPF    RBC, UA 10 TO 20 0 - 2 /HPF    Crystals, UA MODERATE CALCIUM OXALATE (A) None /HPF    Epithelial Cells UA None 0 - 5 /HPF    Bacteria, UA None None    Mucus, UA 1+ (A) None    Other Observations UA (A) NOT REQ. Utilizing a urinalysis as the only screening method to exclude a potential uropathogen can be unreliable in many patient populations. Rapid screening tests are less sensitive than culture and if UTI is a clinical possibility, culture should be considered despite a negative urinalysis. EMERGENCY DEPARTMENT COURSE:        The patient was given the following medications:  Orders Placed This Encounter   Medications    0.9 % sodium chloride bolus    ketorolac (TORADOL) injection 15 mg    oxyCODONE-acetaminophen (PERCOCET) 5-325 MG per tablet     Sig: Take 1 tablet by mouth every 8 hours as needed for Pain for up to 7 days.      Dispense:  5 tablet     Refill:  0    ondansetron (ZOFRAN ODT) 4 MG disintegrating tablet     Sig: Take 1 tablet by mouth every 8 hours as needed for Nausea     Dispense:  10 tablet     Refill:  0        Vitals:    Vitals:    07/23/22 0454   BP: (!) 155/91   Pulse: 74   Resp: 18   Temp: 98.5 °F (36.9 °C)   TempSrc: Oral   SpO2: 98%   Weight: 122.5 kg (270 lb)   Height: 5' 11\" (1.803 m)     -------------------------  BP: (!) 155/91, Temp: 98.5 °F (36.9 °C), Heart Rate: 74, Resp: 18    CONSULTS:  None    CRITICAL CARE: None    PROCEDURES:  None    DIAGNOSIS/ MDM:   Amber Rush is a 78 y.o. male who presents with left flank pain and history of kidney stones. Vital signs are stable. He has minimal left CVA tenderness to palpation that radiates down the left abdomen. Abdomen soft and otherwise nontender. Urinalysis shows large hemoglobin but no signs of infection. CBC and CMP are grossly unremarkable except for slightly elevated glucose. CT abdomen pelvis shows 2 adjacent mildly obstructing stones in the mid left ureter measuring up to 7 mm with mild perinephric stranding. He has bilateral nephrolithiasis. No signs of fecal impaction or bowel obstruction. I suspect the patient's pain is secondary to his kidney stones. He was treated with IV fluids and Toradol with improvement in his pain. He was instructed to continue to take his prescribed Toradol at home but do not exceed 3 days of taking this. The patient was given a prescription for Percocet for breakthrough pain. He was instructed to start his Flomax for 5 days and states that he already has a prescription. He was instructed to strain his urine and to follow-up with his urologist within 1 to 2 days. I instructed him to return to the ER for worsening symptoms or any other concern. The patient understands that at this time there is no evidence for a more malignant underlying process, but also understands that early in the process of an illness or injury, an emergency department work-up can be falsely reassuring. Routine discharge counseling was given, and the patient understands that worsening, changing or persistent symptoms should prompt a immediate call or follow-up with their primary care physician or return to the emergency department. The importance of appropriate follow-up was also discussed. I have reviewed the disposition diagnosis with the patient. I have answered their questions and given discharge instructions.   They voiced understanding of these instructions and did not have any further questions or complaints. FINAL IMPRESSION      1. Left ureteral stone          DISPOSITION/PLAN   DISPOSITION Decision To Discharge 07/23/2022 05:59:58 AM        PATIENT REFERRED TO:  Ursula Cranker, MD  [phone], [address] [facility]    Schedule an appointment as soon as possible for a visit in 2 days      [facility]  [address] [facility] [address] [phone]  Go to   If symptoms worsen    DISCHARGE MEDICATIONS:  New Prescriptions    ONDANSETRON (ZOFRAN ODT) 4 MG DISINTEGRATING TABLET    Take 1 tablet by mouth every 8 hours as needed for Nausea    OXYCODONE-ACETAMINOPHEN (PERCOCET) 5-325 MG PER TABLET    Take 1 tablet by mouth every 8 hours as needed for Pain for up to 7 days.        (Please note that portions of this note were completed with a voice recognitionprogram.  Efforts were made to edit the dictations but occasionally words are mis-transcribed.)    Kobe Palomares DO, [facility]  Emergency Physician Attending          Kobe Palomares DO  07/23/22 [address] [facility]  07/23/22 2424

## 2022-07-25 RX ORDER — KETOROLAC TROMETHAMINE 10 MG/1
10 TABLET, FILM COATED ORAL EVERY 6 HOURS PRN
Qty: 20 TABLET | Refills: 0 | Status: SHIPPED | OUTPATIENT
Start: 2022-07-25

## 2022-07-26 RX ORDER — TAMSULOSIN HYDROCHLORIDE 0.4 MG/1
0.4 CAPSULE ORAL DAILY
COMMUNITY

## 2022-07-28 ENCOUNTER — HOSPITAL ENCOUNTER (OUTPATIENT)
Age: 67
Setting detail: OUTPATIENT SURGERY
Discharge: HOME | End: 2022-07-28
Attending: UROLOGY | Admitting: UROLOGY
Payer: MEDICARE

## 2022-07-28 ENCOUNTER — APPOINTMENT (OUTPATIENT)
Dept: GENERAL RADIOLOGY | Age: 67
End: 2022-07-28
Attending: UROLOGY
Payer: MEDICARE

## 2022-07-28 ENCOUNTER — ANESTHESIA (OUTPATIENT)
Dept: OPERATING ROOM | Age: 67
End: 2022-07-28
Payer: MEDICARE

## 2022-07-28 ENCOUNTER — ANESTHESIA EVENT (OUTPATIENT)
Dept: OPERATING ROOM | Age: 67
End: 2022-07-28
Payer: MEDICARE

## 2022-07-28 VITALS
DIASTOLIC BLOOD PRESSURE: 72 MMHG | OXYGEN SATURATION: 96 % | BODY MASS INDEX: 37.8 KG/M2 | HEIGHT: 71 IN | RESPIRATION RATE: 16 BRPM | HEART RATE: 76 BPM | WEIGHT: 270 LBS | SYSTOLIC BLOOD PRESSURE: 140 MMHG | TEMPERATURE: 97.2 F

## 2022-07-28 DIAGNOSIS — G89.18 POST-OP PAIN: Primary | ICD-10-CM

## 2022-07-28 LAB — GLUCOSE BLD-MCNC: 118 MG/DL (ref 75–110)

## 2022-07-28 PROCEDURE — C2617 STENT, NON-COR, TEM W/O DEL: HCPCS | Performed by: UROLOGY

## 2022-07-28 PROCEDURE — 6360000002 HC RX W HCPCS: Performed by: STUDENT IN AN ORGANIZED HEALTH CARE EDUCATION/TRAINING PROGRAM

## 2022-07-28 PROCEDURE — 2709999900 HC NON-CHARGEABLE SUPPLY: Performed by: UROLOGY

## 2022-07-28 PROCEDURE — 2500000003 HC RX 250 WO HCPCS: Performed by: NURSE ANESTHETIST, CERTIFIED REGISTERED

## 2022-07-28 PROCEDURE — 3209999900 FLUORO FOR SURGICAL PROCEDURES

## 2022-07-28 PROCEDURE — C1769 GUIDE WIRE: HCPCS | Performed by: UROLOGY

## 2022-07-28 PROCEDURE — C1758 CATHETER, URETERAL: HCPCS | Performed by: UROLOGY

## 2022-07-28 PROCEDURE — 3700000001 HC ADD 15 MINUTES (ANESTHESIA): Performed by: UROLOGY

## 2022-07-28 PROCEDURE — 2580000003 HC RX 258: Performed by: NURSE ANESTHETIST, CERTIFIED REGISTERED

## 2022-07-28 PROCEDURE — 2580000003 HC RX 258: Performed by: UROLOGY

## 2022-07-28 PROCEDURE — 82947 ASSAY GLUCOSE BLOOD QUANT: CPT

## 2022-07-28 PROCEDURE — 3700000000 HC ANESTHESIA ATTENDED CARE: Performed by: UROLOGY

## 2022-07-28 PROCEDURE — 3600000004 HC SURGERY LEVEL 4 BASE: Performed by: UROLOGY

## 2022-07-28 PROCEDURE — 2720000010 HC SURG SUPPLY STERILE: Performed by: UROLOGY

## 2022-07-28 PROCEDURE — 7100000001 HC PACU RECOVERY - ADDTL 15 MIN: Performed by: UROLOGY

## 2022-07-28 PROCEDURE — 3600000014 HC SURGERY LEVEL 4 ADDTL 15MIN: Performed by: UROLOGY

## 2022-07-28 PROCEDURE — 7100000000 HC PACU RECOVERY - FIRST 15 MIN: Performed by: UROLOGY

## 2022-07-28 PROCEDURE — 7100000010 HC PHASE II RECOVERY - FIRST 15 MIN: Performed by: UROLOGY

## 2022-07-28 PROCEDURE — 6360000002 HC RX W HCPCS: Performed by: NURSE ANESTHETIST, CERTIFIED REGISTERED

## 2022-07-28 DEVICE — URETERAL STENT
Type: IMPLANTABLE DEVICE | Site: URETER | Status: FUNCTIONAL
Brand: POLARIS™ ULTRA

## 2022-07-28 RX ORDER — MAGNESIUM HYDROXIDE 1200 MG/15ML
LIQUID ORAL PRN
Status: DISCONTINUED | OUTPATIENT
Start: 2022-07-28 | End: 2022-07-28 | Stop reason: HOSPADM

## 2022-07-28 RX ORDER — FENTANYL CITRATE 50 UG/ML
25 INJECTION, SOLUTION INTRAMUSCULAR; INTRAVENOUS EVERY 5 MIN PRN
Status: DISCONTINUED | OUTPATIENT
Start: 2022-07-28 | End: 2022-07-28 | Stop reason: HOSPADM

## 2022-07-28 RX ORDER — CIPROFLOXACIN 500 MG/1
500 TABLET, FILM COATED ORAL 2 TIMES DAILY
Qty: 6 TABLET | Refills: 0 | Status: SHIPPED | OUTPATIENT
Start: 2022-07-28 | End: 2022-07-31

## 2022-07-28 RX ORDER — SODIUM CHLORIDE 0.9 % (FLUSH) 0.9 %
5-40 SYRINGE (ML) INJECTION PRN
Status: DISCONTINUED | OUTPATIENT
Start: 2022-07-28 | End: 2022-07-28 | Stop reason: HOSPADM

## 2022-07-28 RX ORDER — SODIUM CHLORIDE, SODIUM LACTATE, POTASSIUM CHLORIDE, CALCIUM CHLORIDE 600; 310; 30; 20 MG/100ML; MG/100ML; MG/100ML; MG/100ML
INJECTION, SOLUTION INTRAVENOUS CONTINUOUS PRN
Status: DISCONTINUED | OUTPATIENT
Start: 2022-07-28 | End: 2022-07-28 | Stop reason: SDUPTHER

## 2022-07-28 RX ORDER — SODIUM CHLORIDE 0.9 % (FLUSH) 0.9 %
5-40 SYRINGE (ML) INJECTION EVERY 12 HOURS SCHEDULED
Status: DISCONTINUED | OUTPATIENT
Start: 2022-07-28 | End: 2022-07-28 | Stop reason: HOSPADM

## 2022-07-28 RX ORDER — SODIUM CHLORIDE 9 MG/ML
INJECTION, SOLUTION INTRAVENOUS PRN
Status: DISCONTINUED | OUTPATIENT
Start: 2022-07-28 | End: 2022-07-28 | Stop reason: HOSPADM

## 2022-07-28 RX ORDER — ONDANSETRON 2 MG/ML
INJECTION INTRAMUSCULAR; INTRAVENOUS PRN
Status: DISCONTINUED | OUTPATIENT
Start: 2022-07-28 | End: 2022-07-28 | Stop reason: SDUPTHER

## 2022-07-28 RX ORDER — ONDANSETRON 2 MG/ML
4 INJECTION INTRAMUSCULAR; INTRAVENOUS
Status: DISCONTINUED | OUTPATIENT
Start: 2022-07-28 | End: 2022-07-28 | Stop reason: HOSPADM

## 2022-07-28 RX ORDER — LIDOCAINE HYDROCHLORIDE 10 MG/ML
INJECTION, SOLUTION EPIDURAL; INFILTRATION; INTRACAUDAL; PERINEURAL PRN
Status: DISCONTINUED | OUTPATIENT
Start: 2022-07-28 | End: 2022-07-28 | Stop reason: SDUPTHER

## 2022-07-28 RX ORDER — MEPERIDINE HYDROCHLORIDE 50 MG/ML
12.5 INJECTION INTRAMUSCULAR; INTRAVENOUS; SUBCUTANEOUS EVERY 5 MIN PRN
Status: DISCONTINUED | OUTPATIENT
Start: 2022-07-28 | End: 2022-07-28 | Stop reason: HOSPADM

## 2022-07-28 RX ORDER — FENTANYL CITRATE 50 UG/ML
INJECTION, SOLUTION INTRAMUSCULAR; INTRAVENOUS PRN
Status: DISCONTINUED | OUTPATIENT
Start: 2022-07-28 | End: 2022-07-28 | Stop reason: SDUPTHER

## 2022-07-28 RX ORDER — PHENYLEPHRINE HCL IN 0.9% NACL 0.5 MG/5ML
SYRINGE (ML) INTRAVENOUS PRN
Status: DISCONTINUED | OUTPATIENT
Start: 2022-07-28 | End: 2022-07-28 | Stop reason: SDUPTHER

## 2022-07-28 RX ORDER — POLYETHYLENE GLYCOL 3350 17 G/17G
17 POWDER, FOR SOLUTION ORAL DAILY
Qty: 510 G | Refills: 0 | Status: SHIPPED | OUTPATIENT
Start: 2022-07-28 | End: 2022-08-27

## 2022-07-28 RX ORDER — DEXAMETHASONE SODIUM PHOSPHATE 10 MG/ML
INJECTION INTRAMUSCULAR; INTRAVENOUS PRN
Status: DISCONTINUED | OUTPATIENT
Start: 2022-07-28 | End: 2022-07-28 | Stop reason: SDUPTHER

## 2022-07-28 RX ORDER — OXYCODONE HYDROCHLORIDE AND ACETAMINOPHEN 5; 325 MG/1; MG/1
1 TABLET ORAL EVERY 6 HOURS PRN
Qty: 12 TABLET | Refills: 0 | Status: SHIPPED | OUTPATIENT
Start: 2022-07-28 | End: 2022-08-02

## 2022-07-28 RX ORDER — OXYBUTYNIN CHLORIDE 10 MG/1
10 TABLET, EXTENDED RELEASE ORAL DAILY
Qty: 30 TABLET | Refills: 0 | Status: SHIPPED | OUTPATIENT
Start: 2022-07-28

## 2022-07-28 RX ORDER — MAGNESIUM HYDROXIDE 1200 MG/15ML
LIQUID ORAL CONTINUOUS PRN
Status: DISCONTINUED | OUTPATIENT
Start: 2022-07-28 | End: 2022-07-28 | Stop reason: HOSPADM

## 2022-07-28 RX ORDER — FENTANYL CITRATE 50 UG/ML
50 INJECTION, SOLUTION INTRAMUSCULAR; INTRAVENOUS EVERY 5 MIN PRN
Status: DISCONTINUED | OUTPATIENT
Start: 2022-07-28 | End: 2022-07-28 | Stop reason: HOSPADM

## 2022-07-28 RX ORDER — PROPOFOL 10 MG/ML
INJECTION, EMULSION INTRAVENOUS PRN
Status: DISCONTINUED | OUTPATIENT
Start: 2022-07-28 | End: 2022-07-28 | Stop reason: SDUPTHER

## 2022-07-28 RX ADMIN — FENTANYL CITRATE 50 MCG: 50 INJECTION, SOLUTION INTRAMUSCULAR; INTRAVENOUS at 14:53

## 2022-07-28 RX ADMIN — Medication 100 MCG: at 15:10

## 2022-07-28 RX ADMIN — DEXAMETHASONE SODIUM PHOSPHATE 10 MG: 10 INJECTION INTRAMUSCULAR; INTRAVENOUS at 14:52

## 2022-07-28 RX ADMIN — FENTANYL CITRATE 50 MCG: 50 INJECTION, SOLUTION INTRAMUSCULAR; INTRAVENOUS at 14:47

## 2022-07-28 RX ADMIN — Medication 3000 MG: at 14:58

## 2022-07-28 RX ADMIN — PROPOFOL 200 MG: 10 INJECTION, EMULSION INTRAVENOUS at 14:45

## 2022-07-28 RX ADMIN — SODIUM CHLORIDE, POTASSIUM CHLORIDE, SODIUM LACTATE AND CALCIUM CHLORIDE: 600; 310; 30; 20 INJECTION, SOLUTION INTRAVENOUS at 14:38

## 2022-07-28 RX ADMIN — ONDANSETRON 4 MG: 2 INJECTION INTRAMUSCULAR; INTRAVENOUS at 14:52

## 2022-07-28 RX ADMIN — LIDOCAINE HYDROCHLORIDE 50 MG: 10 INJECTION, SOLUTION EPIDURAL; INFILTRATION; INTRACAUDAL; PERINEURAL at 14:45

## 2022-07-28 RX ADMIN — Medication 100 MCG: at 15:07

## 2022-07-28 ASSESSMENT — PAIN - FUNCTIONAL ASSESSMENT: PAIN_FUNCTIONAL_ASSESSMENT: NONE - DENIES PAIN

## 2022-07-28 NOTE — H&P
[facility] Song  Urology History & Physical      Patient:  Gloris Mcardle  MRN: [id number]  YOB: 1955    CHIEF COMPLAINT: Left urolithiasis    HISTORY OF PRESENT ILLNESS:   The patient is a 78 y.o. male who presented to the ED a few days ago with left flank pain secondary to left distal obstructing 6 to 7 mm ureteral stone. Patient also has a mid pole stone and several lower pole stones also on the left side. He he presents today to address his left-sided stone burden. Patient's old records, notes and chart reviewed and summarized above. Past Medical History:    Past Medical History:   Diagnosis Date    Diabetes mellitus ([facility] [address]     Hypertension     Kidney stone     Wears prescription eyeglasses     Wellness examination 07/01/2022    [facility]       Past Surgical History:    Past Surgical History:   Procedure Laterality Date    FINGER TRIGGER RELEASE      TONSILLECTOMY         Medications:    No current facility-administered medications for this encounter. Allergies:     Allergies   Allergen Reactions    Pcn [Penicillins]     Sulfa Antibiotics        Social History:   Social History     Socioeconomic History    Marital status:      Spouse name: Not on file    Number of children: Not on file    Years of education: Not on file    Highest education level: Not on file   Occupational History    Not on file   Tobacco Use    Smoking status: Never    Smokeless tobacco: Never   Vaping Use    Vaping Use: Never used   Substance and Sexual Activity    Alcohol use: Yes     Comment: Socially    Drug use: Never    Sexual activity: Not on file   Other Topics Concern    Not on file   Social History Narrative    Not on file     Social Determinants of Health     Financial Resource Strain: Not on file   Food Insecurity: Not on file   Transportation Needs: Not on file   Physical Activity: Not on file   Stress: Not on file   Social Connections: Not on file   Intimate Partner Violence: Not on file   Housing Stability: Not on file       Family History:    Family History   Problem Relation Age of Onset    Other Mother     Cancer Mother     Kidney Disease Mother     Heart Disease Mother     COPD Mother     Colon Cancer Father     Stroke Father        REVIEW OF SYSTEMS:  A comprehensive 14 point review of systems was obtained. Constitutional: No fatigue  Eyes: No blurry vision  Ears, nose, mouth, throat, face: No ringing in the ears; no facial droop. Respiratory: No cough or cold. Cardiovascular: No palpitations  Gastrointestinal: No diarrhea or constipation. Genitourinary: No burning with urination  Integument/Skin: No rashes  Hematologic/Lymphatic: No easy bruising  Musculoskeletal: No muscle pains  Neurologic: No weakness in the extremities. Psychiatric: No depression or suicidal thoughts. Endocrine: No heat or cold intolerances. Allergic/Immunologic: No current seasonal allergies; no skin hives. Physical Exam:    This a 78 y.o. male   There were no vitals filed for this visit. Constitutional: Patient in no acute distress. Neuro: alert and oriented to person place and time. Psych: Mood and affect normal.   Head: Atraumatic and normocephalic. Neck: Trachea midline   Skin: No rashes or bruising present. Lungs: Respiratory effort normal.  Cardiovascular:  Heart rate regular. Positive radial pulses bilaterally  Abdomen: Soft, non-tender, non-distended. Left side has CVA tenderness on exam.      Labs:  No results for input(s): WBC, HGB, HCT, MCV, PLT in the last 72 hours. No results for input(s): NA, K, CL, CO2, PHOS, BUN, CREATININE, CA in the last 72 hours. No results for input(s): COLORU, PHUR, LABCAST, WBCUA, RBCUA, MUCUS, TRICHOMONAS, YEAST, BACTERIA, CLARITYU, SPECGRAV, LEUKOCYTESUR, UROBILINOGEN, Darnella Montane in the last 72 hours.     Invalid input(s): NITRATE, GLUCOSEUKETONESUAMORPHOUS    Culture Results:  [unfilled]      -----------------------------------------------------------------  Imaging Results:  No results found.     Assessment and Plan   Impression:    Left obstructing ureteral stone  Left renal stones      Plan:   Cystoscopy, left ureteroscopy, holmium laser lithotripsy, left ureteral stent placement    Alfredito Harrison DO  Urology Resident, PGY3  12:31 PM 7/28/2022

## 2022-07-28 NOTE — ANESTHESIA PRE PROCEDURE
Department of Anesthesiology  Preprocedure Note       Name:  Elsa Cary   Age:  78 y.o.  :  1955                                          MRN:  [id number]         Date:  2022      Surgeon: Scarlett Magallanes):  Todd Valencia MD    Procedure: Procedure(s):  HOLMIUM LASER CYSTOSCOPY,  URETEROSCOPY,  STENT PLACEMENT    Medications prior to admission:   Prior to Admission medications    Medication Sig Start Date End Date Taking? Authorizing Provider   oxyCODONE-acetaminophen (PERCOCET) 5-325 MG per tablet Take 1 tablet by mouth every 6 hours as needed for Pain for up to 5 days. May take 2 tablets if needed 22 Yes Yocasta Green PA-C   polyethylene glycol [facility]) 17 GM/SCOOP powder Take 17 g by mouth in the morning. 22 Yes Yocasta Green PA-C   oxybutynin (DITROPAN XL) 10 MG extended release tablet Take 1 tablet by mouth in the morning. 22  Yes Yocasta Green PA-C   ciprofloxacin (CIPRO) 500 MG tablet Take 1 tablet by mouth in the morning and 1 tablet before bedtime. Do all this for 3 days. 22 Yes Yocasta Green PA-C   tamsulosin (FLOMAX) 0.4 MG capsule Take 0.4 mg by mouth in the morning.    Yes Historical Provider, MD   ketorolac (TORADOL) 10 MG tablet Take 1 tablet by mouth every 6 hours as needed for Pain 22   Todd Valencia MD   ondansetron (ZOFRAN ODT) 4 MG disintegrating tablet Take 1 tablet by mouth every 8 hours as needed for Nausea  Patient not taking: Reported on 2022   Audrene Fee, DO   atorvastatin (LIPITOR) 20 MG tablet 1 tablet    Historical Provider, MD   SITagliptin-metFORMIN (JANUMET XR)  MG TB24 per extended release tablet 2 Tabs daily Orally 30 day(s)    Historical Provider, MD   irbesartan (AVAPRO) 150 MG tablet TAKE 1 TABLET BY MOUTH ONE TIME A DAY    Historical Provider, MD       Current medications:    Current Facility-Administered Medications   Medication Dose Route Frequency Provider Last Rate Last Admin    ceFAZolin (ANCEF) 3000 mg in sterile water 30 mL IV syringe  3,000 mg IntraVENous Once [facility], DO           Allergies: Allergies   Allergen Reactions    Pcn [Penicillins]     Sulfa Antibiotics        Problem List:  There is no problem list on file for this patient. Past Medical History:        Diagnosis Date    Diabetes mellitus ([address]     Hypertension     Kidney stone     Wears prescription eyeglasses     Wellness examination 07/01/2022    [facility]       Past Surgical History:        Procedure Laterality Date    FINGER TRIGGER RELEASE      TONSILLECTOMY         Social History:    Social History     Tobacco Use    Smoking status: Never    Smokeless tobacco: Never   Substance Use Topics    Alcohol use: Yes     Comment: Socially                                Counseling given: Not Answered      Vital Signs (Current):   Vitals:    07/26/22 0906 07/28/22 1240   BP:  (!) 151/88   Pulse:  70   Resp:  18   Temp:  36 °C (96.8 °F)   TempSrc:  Temporal   SpO2:  99%   Weight: 270 lb (122.5 kg) 270 lb (122.5 kg)   Height: 5' 11\" (1.803 m) 5' 11\" (1.803 m)                                              BP Readings from Last 3 Encounters:   07/28/22 (!) 151/88   07/23/22 (!) 155/91   10/23/20 (!) 153/82       NPO Status: Time of last liquid consumption: 2100                        Time of last solid consumption: 2100                        Date of last liquid consumption: 07/27/22                        Date of last solid food consumption: 07/27/22    BMI:   Wt Readings from Last 3 Encounters:   07/28/22 270 lb (122.5 kg)   07/23/22 270 lb (122.5 kg)   10/23/20 260 lb (117.9 kg)     Body mass index is 37.66 kg/m².     CBC:   Lab Results   Component Value Date/Time    WBC 11.1 07/23/2022 05:15 AM    RBC 4.97 07/23/2022 05:15 AM    HGB 14.0 07/23/2022 05:15 AM    HCT 43.8 07/23/2022 05:15 AM    MCV 88.1 07/23/2022 05:15 AM    RDW 14.9 07/23/2022 05:15 AM     07/23/2022 05:15 AM       CMP:   Lab Results   Component Value Date/Time     07/23/2022 05:15 AM    K 4.6 07/23/2022 05:15 AM     07/23/2022 05:15 AM    CO2 24 07/23/2022 05:15 AM    BUN 20 07/23/2022 05:15 AM    CREATININE 1.20 07/23/2022 05:15 AM    GFRAA >60 07/23/2022 05:15 AM    LABGLOM >60 07/23/2022 05:15 AM    GLUCOSE 237 07/23/2022 05:15 AM    PROT 6.9 07/23/2022 05:15 AM    CALCIUM 8.8 07/23/2022 05:15 AM    BILITOT 0.40 07/23/2022 05:15 AM    ALKPHOS 93 07/23/2022 05:15 AM    AST 32 07/23/2022 05:15 AM    ALT 28 07/23/2022 05:15 AM       POC Tests:   Recent Labs     07/28/22  1254   POCGLU 118*       Coags: No results found for: PROTIME, INR, APTT    HCG (If Applicable): No results found for: PREGTESTUR, PREGSERUM, HCG, HCGQUANT     ABGs: No results found for: PHART, PO2ART, APQ9NLV, EHC9OMC, BEART, H6QUEIOC     Type & Screen (If Applicable):  No results found for: LABABO, LABRH    Drug/Infectious Status (If Applicable):  No results found for: HIV, HEPCAB    COVID-19 Screening (If Applicable): No results found for: COVID19        Anesthesia Evaluation  Patient summary reviewed and Nursing notes reviewed  Airway: Mallampati: III          Dental: normal exam         Pulmonary:normal exam                               Cardiovascular:    (+) hypertension:, hyperlipidemia      ECG reviewed                        Neuro/Psych:               GI/Hepatic/Renal: Neg GI/Hepatic/Renal ROS            Endo/Other:    (+) Diabetes, . Abdominal:             Vascular: negative vascular ROS. Other Findings:           Anesthesia Plan      general     ASA 3           MIPS: Postoperative opioids intended and Prophylactic antiemetics administered. Anesthetic plan and risks discussed with patient. Plan discussed with CRNA and attending.     Attending anesthesiologist reviewed and agrees with Preprocedure content                Art TIANNA Malone - CRNA   7/28/2022

## 2022-07-28 NOTE — DISCHARGE INSTRUCTIONS
Ureteroscopy Discharge Instructions: Take prescriptions as directed, ensuring you take entire course of antibiotic. No driving while taking narcotic pain medication. Wean off narcotics as soon as able. OK to shower after discharge. May resume regular diet. No heavy lifting >10lbs day of procedure, avoid strenuous activity, may walk. You may have a string taped to your pelvis, genitalia, or inner thigh. Please take care while showering/wiping that you do not tug on the string and dislodge your indwelling stent early. OK to remove stent by pulling the string straight out in 5 days. Please call the office if you have difficulty removing your stent  You may see blood in the urine after the procedure and entire time stent is in place. This should resolve over the next couple days. Please stay hydrated. You may experience flank pain, and/or frequency/urgency of urination while the stent is in place. Please use Flomax (Tamsulosin) to help with these symptoms. Please call attending physician or hospital  with questions. Please call or present to ED for fever >101 F, intractable nausea and vomiting, or uncontrolled pain. Follow up with Dr. Elliot Feliciano in 6 weeks with renal ultrasound prior to appointment. Call office to confirm appointment. Pt should pull stent in the morning of 8/2/22. There may be some pain associated with the stent removal, which is usually self limiting. We suggest using the pain medication prescribed for you and a nonsteroidal anti-inflammatory such as Ibuprofen, if you are able to take this medication, to control symptoms. Take Ibuprofen as directed for 24 hrs after stent pull. Please stay hydrated. Please call with questions.

## 2022-08-04 NOTE — ANESTHESIA POSTPROCEDURE EVALUATION
Department of Anesthesiology  Postprocedure Note    Patient: Alma Pineda  MRN: [id number]  YOB: 1955  Date of evaluation: 8/4/2022      Procedure Summary     Date: 07/28/22 Room / Location: [facility] 02 / [address] [facility]    Anesthesia Start: 5761 Anesthesia Stop: 3614    Procedure: HOLMIUM LASER LITHOTRIPSY, CYSTOSCOPY,  URETEROSCOPY,  STENT PLACEMENT (Left) Diagnosis:       Kidney stones      (LEFT KIDNEY STONE])    Surgeons: Elliott Miller MD Responsible Provider: João Osuna MD    Anesthesia Type: general ASA Status: 3          Anesthesia Type: No value filed.     Carla Phase I: Carla Score: 10    Crala Phase II: Carla Score: 10      Anesthesia Post Evaluation    Patient location during evaluation: PACU  Patient participation: complete - patient participated  Level of consciousness: awake and alert  Pain score: 3  Airway patency: patent  Nausea & Vomiting: no vomiting and no nausea  Complications: no  Cardiovascular status: hemodynamically stable  Respiratory status: acceptable  Hydration status: stable

## 2022-08-07 NOTE — OP NOTE
[address] [place] [address]. [place]    DATE: 7/28/2022  Patient:  Michael Villasenor  MRN: [id number]  YOB: 1955    SURGEON: Thierno Cordoba MD.    ASSISTANT: none    PREOPERATIVE DIAGNOSIS: left ureteral stone left kidney stones    POSTOPERATIVE DIAGNOSIS:  left ureteral stone left kidney stones    PROCEDURE PERFORMED: cystoscopy, left ureteroscopy left holmium laser lithotripsy  left stent placement    ANESTHESIA: General    COMPLICATIONS: none    OR BLOOD LOSS:  Minimal    FLUIDS: Cystalloids per Anesthesia    SPECIMENS:  * No specimens in log *  none    DRAINS: 6 x 26 double j stent with string    INDICATIONS FOR PROCEDURE:  The patient is a 78 y.o. male who presents today with LEFT KIDNEY STONE] here for HOLMIUM LASER LITHOTRIPSY, CYSTOSCOPY,  URETEROSCOPY,  STENT PLACEMENT. After risks, benefits and alternatives of the procedure were discussed with the patient, the patient elected to proceed. DETAILS OF PROCEDURE:  After informed consent was obtained in the preoperative area, the patient was taken back to the operating room and transferred to the operating table in supine position. Anesthesia was induced and antibiotics were given. The patient was placed in modified dorsal lithotomy position and sterilely prepped and draped in a standard fashion. A timeout occurred. Two patient identifiers were used. We entered the urethra with a 22 [place] scope. We focused our attention on the left ureteral orifice. The ureteral orifice was visualized, and using a dual lumen catheter two wires were advanced into the kidney under fluoroscopic guidance. .    The flexible ureteroscope was assembled, place over one Glidewire, and advanced into the ureter carefully under fluoroscopy. The second wire remained in place as a safety. we were able to advance our scope up to the stone without difficulty. The stones were located in the mid ureter and kidney. We used a 200 micron laser to fragment all stones into sub 1-2 mm fragments for easy passage and clearance            Repeat nephroscopy and ureteroscopy demonstrated no further stone fragments. In addition, there were no papillary lesions within the kidney, or erythematous patches concerning for malignant disease. With the safety wire in place, the ureteroscope was slowly retracted down the ureter. The entire ureter was surveyed. There was no evidence of stricture, stone disease, ureteral trauma, or papillary lesion. To place the stent a 22 Mont[place]grin rigid cystoscopy was back loaded over the wire. Under direct visualization the stent was advanced until it was in proper location. The Glidewire was then removed. A curl could be seen in the Left renal pelvis under using fluoroscopic vision, and in the bladder under direct visualization. A string was left on the stent. The patients bladder was drained. All instrumentation was removed. The patient was then awakened and discharged back to the PACU in good and stable condition.

## 2023-06-07 ENCOUNTER — HOSPITAL ENCOUNTER (EMERGENCY)
Facility: CLINIC | Age: 68
Discharge: HOME | End: 2023-06-07
Attending: SPECIALIST
Payer: MEDICARE

## 2023-06-07 ENCOUNTER — APPOINTMENT (OUTPATIENT)
Dept: CT IMAGING | Facility: CLINIC | Age: 68
End: 2023-06-07
Attending: SPECIALIST
Payer: MEDICARE

## 2023-06-07 VITALS
BODY MASS INDEX: 32.5 KG/M2 | HEART RATE: 74 BPM | SYSTOLIC BLOOD PRESSURE: 127 MMHG | RESPIRATION RATE: 15 BRPM | DIASTOLIC BLOOD PRESSURE: 74 MMHG | WEIGHT: 233 LBS | OXYGEN SATURATION: 98 % | TEMPERATURE: 97.6 F

## 2023-06-07 DIAGNOSIS — N20.1 URETERIC STONE: Primary | ICD-10-CM

## 2023-06-07 DIAGNOSIS — N20.0 KIDNEY STONE: ICD-10-CM

## 2023-06-07 LAB
ALBUMIN SERPL-MCNC: 4.4 G/DL (ref 3.5–5.2)
ALBUMIN/GLOB SERPL: 1.4 {RATIO} (ref 1–2.5)
ALP SERPL-CCNC: 89 U/L (ref 40–129)
ALT SERPL-CCNC: 18 U/L (ref 5–41)
ANION GAP SERPL CALCULATED.3IONS-SCNC: 10 MMOL/L (ref 9–17)
AST SERPL-CCNC: 21 U/L
BACTERIA URNS QL MICRO: ABNORMAL
BASOPHILS # BLD: 0.1 K/UL (ref 0–0.2)
BASOPHILS NFR BLD: 1 % (ref 0–2)
BILIRUB SERPL-MCNC: 0.4 MG/DL (ref 0.3–1.2)
BILIRUB UR QL STRIP: NEGATIVE
BUN SERPL-MCNC: 16 MG/DL (ref 8–23)
CALCIUM SERPL-MCNC: 10 MG/DL (ref 8.6–10.4)
CHARACTER UR: ABNORMAL
CHLORIDE SERPL-SCNC: 103 MMOL/L (ref 98–107)
CLARITY UR: CLEAR
CO2 SERPL-SCNC: 27 MMOL/L (ref 20–31)
COLOR UR: YELLOW
CREAT SERPL-MCNC: 0.8 MG/DL (ref 0.7–1.2)
EOSINOPHIL # BLD: 0.3 K/UL (ref 0–0.4)
EOSINOPHILS RELATIVE PERCENT: 3 % (ref 1–4)
EPI CELLS #/AREA URNS HPF: ABNORMAL /HPF (ref 0–5)
ERYTHROCYTE [DISTWIDTH] IN BLOOD BY AUTOMATED COUNT: 14.8 % (ref 12.5–15.4)
GFR SERPL CREATININE-BSD FRML MDRD: >60 ML/MIN/1.73M2
GLUCOSE SERPL-MCNC: 134 MG/DL (ref 70–99)
GLUCOSE UR STRIP-MCNC: ABNORMAL MG/DL
HCT VFR BLD AUTO: 43.9 % (ref 41–53)
HGB BLD-MCNC: 14.4 G/DL (ref 13.5–17.5)
HGB UR QL STRIP.AUTO: ABNORMAL
KETONES UR STRIP-MCNC: NEGATIVE MG/DL
LACTATE BLDV-SCNC: 1.7 MMOL/L (ref 0.5–2.2)
LEUKOCYTE ESTERASE UR QL STRIP: NEGATIVE
LIPASE SERPL-CCNC: 57 U/L (ref 13–60)
LYMPHOCYTES # BLD: 24 % (ref 24–44)
LYMPHOCYTES NFR BLD: 2.5 K/UL (ref 1–4.8)
MCH RBC QN AUTO: 28.3 PG (ref 26–34)
MCHC RBC AUTO-ENTMCNC: 32.8 G/DL (ref 31–37)
MCV RBC AUTO: 86.4 FL (ref 80–100)
MONOCYTES NFR BLD: 0.6 K/UL (ref 0.1–1.2)
MONOCYTES NFR BLD: 6 % (ref 2–11)
NEUTROPHILS NFR BLD: 66 % (ref 36–66)
NEUTS SEG NFR BLD: 7.1 K/UL (ref 1.8–7.7)
NITRITE UR QL STRIP: NEGATIVE
PH UR STRIP: 5.5 [PH] (ref 5–8)
PLATELET # BLD AUTO: 204 K/UL (ref 140–450)
PMV BLD AUTO: 9 FL (ref 6–12)
POTASSIUM SERPL-SCNC: 4.7 MMOL/L (ref 3.7–5.3)
PROT SERPL-MCNC: 7.6 G/DL (ref 6.4–8.3)
PROT UR STRIP-MCNC: ABNORMAL MG/DL
RBC # BLD AUTO: 5.08 M/UL (ref 4.5–5.9)
RBC #/AREA URNS HPF: ABNORMAL /HPF (ref 0–2)
SODIUM SERPL-SCNC: 140 MMOL/L (ref 135–144)
SP GR UR STRIP: 1.02 (ref 1–1.03)
UROBILINOGEN UR STRIP-ACNC: NORMAL
WBC #/AREA URNS HPF: ABNORMAL /HPF (ref 0–5)
WBC OTHER # BLD: 10.8 K/UL (ref 3.5–11)

## 2023-06-07 PROCEDURE — 6360000002 HC RX W HCPCS: Performed by: SPECIALIST

## 2023-06-07 PROCEDURE — 36415 COLL VENOUS BLD VENIPUNCTURE: CPT

## 2023-06-07 PROCEDURE — 81001 URINALYSIS AUTO W/SCOPE: CPT

## 2023-06-07 PROCEDURE — 6370000000 HC RX 637 (ALT 250 FOR IP): Performed by: SPECIALIST

## 2023-06-07 PROCEDURE — 74176 CT ABD & PELVIS W/O CONTRAST: CPT

## 2023-06-07 PROCEDURE — 83690 ASSAY OF LIPASE: CPT

## 2023-06-07 PROCEDURE — 85027 COMPLETE CBC AUTOMATED: CPT

## 2023-06-07 PROCEDURE — 80053 COMPREHEN METABOLIC PANEL: CPT

## 2023-06-07 PROCEDURE — 83605 ASSAY OF LACTIC ACID: CPT

## 2023-06-07 RX ORDER — TIRZEPATIDE 2.5 MG/.5ML
2.5 INJECTION, SOLUTION SUBCUTANEOUS WEEKLY
COMMUNITY

## 2023-06-07 RX ORDER — TAMSULOSIN HYDROCHLORIDE 0.4 MG/1
0.4 CAPSULE ORAL DAILY
Qty: 5 CAPSULE | Refills: 0 | Status: ON HOLD | OUTPATIENT
Start: 2023-06-07 | End: 2023-06-09 | Stop reason: SDUPTHER

## 2023-06-07 RX ORDER — KETOROLAC TROMETHAMINE 15 MG/ML
15 INJECTION, SOLUTION INTRAMUSCULAR; INTRAVENOUS ONCE
Status: COMPLETED | OUTPATIENT
Start: 2023-06-07 | End: 2023-06-07

## 2023-06-07 RX ORDER — KETOROLAC TROMETHAMINE 10 MG/1
10 TABLET, FILM COATED ORAL EVERY 6 HOURS PRN
Qty: 20 TABLET | Refills: 0 | Status: SHIPPED | OUTPATIENT
Start: 2023-06-07

## 2023-06-07 RX ORDER — TAMSULOSIN HYDROCHLORIDE 0.4 MG/1
0.4 CAPSULE ORAL ONCE
Status: COMPLETED | OUTPATIENT
Start: 2023-06-07 | End: 2023-06-07

## 2023-06-07 RX ADMIN — TAMSULOSIN HYDROCHLORIDE 0.4 MG: 0.4 CAPSULE ORAL at 06:15

## 2023-06-07 RX ADMIN — KETOROLAC TROMETHAMINE 15 MG: 15 INJECTION, SOLUTION INTRAMUSCULAR; INTRAVENOUS at 04:09

## 2023-06-07 ASSESSMENT — PAIN SCALES - GENERAL
PAINLEVEL_OUTOF10: 0
PAINLEVEL_OUTOF10: 4

## 2023-06-07 ASSESSMENT — PAIN DESCRIPTION - LOCATION: LOCATION: ABDOMEN

## 2023-06-07 ASSESSMENT — PAIN DESCRIPTION - DESCRIPTORS: DESCRIPTORS: CRAMPING

## 2023-06-07 ASSESSMENT — PAIN - FUNCTIONAL ASSESSMENT
PAIN_FUNCTIONAL_ASSESSMENT: 0-10
PAIN_FUNCTIONAL_ASSESSMENT: NONE - DENIES PAIN

## 2023-06-07 ASSESSMENT — PAIN DESCRIPTION - PAIN TYPE: TYPE: ACUTE PAIN

## 2023-06-07 ASSESSMENT — ENCOUNTER SYMPTOMS
NAUSEA: 0
DIARRHEA: 0
SHORTNESS OF BREATH: 0
BLOOD IN STOOL: 0
ABDOMINAL PAIN: 1
COUGH: 0
BACK PAIN: 0
SINUS PAIN: 0

## 2023-06-07 ASSESSMENT — PAIN DESCRIPTION - FREQUENCY: FREQUENCY: CONTINUOUS

## 2023-06-07 ASSESSMENT — PAIN DESCRIPTION - ORIENTATION: ORIENTATION: RIGHT;LOWER

## 2023-06-07 NOTE — ED NOTES
Pt presents to the ED via private auto. Pt states he awoke this am with right lower quad abdominal pain described as cramping. Pt has a history of kidney stones.      Barbara Self RN  06/07/23 4740

## 2023-06-07 NOTE — ED PROVIDER NOTES
stronger pain medicines as Norco or Percocet did not help him for the pain. Ketorolac orally works wonderfully for him. PROCEDURES:  None    FINAL IMPRESSION      1. Ureteric stone    2. Kidney stone          DISPOSITION/PLAN   DISPOSITION Decision To Discharge 06/07/2023 06:09:50 AM      Condition on Disposition    Stable    PATIENT REFERRED TO:  Janay Lennon MD  [address]  [phone]    Call today  For reevaluation of current symptoms    [facility] ED  / [place] [address]  [phone]    If symptoms worsen      DISCHARGE MEDICATIONS:  New Prescriptions    KETOROLAC (TORADOL) 10 MG TABLET    Take 1 tablet by mouth every 6 hours as needed for Pain    TAMSULOSIN (FLOMAX) 0.4 MG CAPSULE    Take 1 capsule by mouth daily for 5 doses       (Please note that portions of this note were completed with a voice recognition program.  Efforts were made to edit the dictations but occasionally words are mis-transcribed.)    Aldo Mooney MD,, MD, F.A.C.E.P.   Attending Emergency Physician       Aldo Mooney MD  06/07/23 2023

## 2023-06-07 NOTE — DISCHARGE INSTRUCTIONS
Please understand that at this time there is no evidence for a more serious underlying process, but that early in the process of an illness or injury, an emergency department workup can be falsely reassuring. You should contact your family doctor within the next 48 hours for a follow up appointment    Maida Barros!!!    From [facility] ([facility]) and [facility]    On behalf of the Emergency Department staff at [facility]), I would like to thank you for giving us the opportunity to address your health care needs and concerns. We hope that during your visit, our service was delivered in a professional and caring manner. Please keep [facility] ([facility]) in mind as we walk with you down the path to your own personal wellness. Please expect an automated text message or email from us so we can ask a few questions about your health and progress. Based on your answers, a clinician may call you back to offer help and instructions. Please understand that early in the process of an illness or injury, an emergency department workup can be falsely reassuring. If you notice any worsening, changing or persistent symptoms please call your family doctor or return to the ER immediately. Tell us how we did during your visit at [email or web address]   and let us know about your experience

## 2023-06-08 ENCOUNTER — ANESTHESIA EVENT (OUTPATIENT)
Dept: OPERATING ROOM | Age: 68
End: 2023-06-08
Payer: MEDICARE

## 2023-06-09 ENCOUNTER — APPOINTMENT (OUTPATIENT)
Dept: GENERAL RADIOLOGY | Age: 68
End: 2023-06-09
Attending: UROLOGY
Payer: MEDICARE

## 2023-06-09 ENCOUNTER — ANESTHESIA (OUTPATIENT)
Dept: OPERATING ROOM | Age: 68
End: 2023-06-09
Payer: MEDICARE

## 2023-06-09 ENCOUNTER — HOSPITAL ENCOUNTER (OUTPATIENT)
Age: 68
Setting detail: OUTPATIENT SURGERY
Discharge: HOME | End: 2023-06-09
Attending: UROLOGY | Admitting: UROLOGY
Payer: MEDICARE

## 2023-06-09 VITALS
BODY MASS INDEX: 32.62 KG/M2 | HEIGHT: 71 IN | SYSTOLIC BLOOD PRESSURE: 126 MMHG | TEMPERATURE: 96.8 F | OXYGEN SATURATION: 95 % | HEART RATE: 79 BPM | WEIGHT: 233 LBS | DIASTOLIC BLOOD PRESSURE: 74 MMHG | RESPIRATION RATE: 13 BRPM

## 2023-06-09 DIAGNOSIS — G89.18 ACUTE POST-OPERATIVE PAIN: Primary | ICD-10-CM

## 2023-06-09 LAB — GLUCOSE BLD-MCNC: 88 MG/DL (ref 75–110)

## 2023-06-09 PROCEDURE — 82947 ASSAY GLUCOSE BLOOD QUANT: CPT

## 2023-06-09 PROCEDURE — 2580000003 HC RX 258: Performed by: UROLOGY

## 2023-06-09 PROCEDURE — 3209999900 FLUORO FOR SURGICAL PROCEDURES

## 2023-06-09 PROCEDURE — 2580000003 HC RX 258: Performed by: ANESTHESIOLOGY

## 2023-06-09 PROCEDURE — 6360000002 HC RX W HCPCS: Performed by: STUDENT IN AN ORGANIZED HEALTH CARE EDUCATION/TRAINING PROGRAM

## 2023-06-09 RX ORDER — SODIUM CHLORIDE 0.9 % (FLUSH) 0.9 %
5-40 SYRINGE (ML) INJECTION EVERY 12 HOURS SCHEDULED
Status: CANCELLED | OUTPATIENT
Start: 2023-06-09

## 2023-06-09 RX ORDER — FENTANYL CITRATE 50 UG/ML
25 INJECTION, SOLUTION INTRAMUSCULAR; INTRAVENOUS EVERY 5 MIN PRN
Status: CANCELLED | OUTPATIENT
Start: 2023-06-09

## 2023-06-09 RX ORDER — MEPERIDINE HYDROCHLORIDE 50 MG/ML
12.5 INJECTION INTRAMUSCULAR; INTRAVENOUS; SUBCUTANEOUS EVERY 5 MIN PRN
Status: CANCELLED | OUTPATIENT
Start: 2023-06-09

## 2023-06-09 RX ORDER — KETOROLAC TROMETHAMINE 30 MG/ML
INJECTION, SOLUTION INTRAMUSCULAR; INTRAVENOUS PRN
Status: DISCONTINUED | OUTPATIENT
Start: 2023-06-09 | End: 2023-06-09 | Stop reason: SDUPTHER

## 2023-06-09 RX ORDER — LIDOCAINE HYDROCHLORIDE 10 MG/ML
INJECTION, SOLUTION EPIDURAL; INFILTRATION; INTRACAUDAL; PERINEURAL PRN
Status: DISCONTINUED | OUTPATIENT
Start: 2023-06-09 | End: 2023-06-09 | Stop reason: SDUPTHER

## 2023-06-09 RX ORDER — PROPOFOL 10 MG/ML
INJECTION, EMULSION INTRAVENOUS PRN
Status: DISCONTINUED | OUTPATIENT
Start: 2023-06-09 | End: 2023-06-09 | Stop reason: SDUPTHER

## 2023-06-09 RX ORDER — TRAMADOL HYDROCHLORIDE 50 MG/1
50 TABLET ORAL EVERY 4 HOURS PRN
Qty: 10 TABLET | Refills: 0 | Status: SHIPPED | OUTPATIENT
Start: 2023-06-09 | End: 2023-06-12

## 2023-06-09 RX ORDER — TAMSULOSIN HYDROCHLORIDE 0.4 MG/1
0.4 CAPSULE ORAL DAILY
Qty: 90 CAPSULE | Refills: 1 | Status: SHIPPED | OUTPATIENT
Start: 2023-06-09

## 2023-06-09 RX ORDER — FENTANYL CITRATE 50 UG/ML
50 INJECTION, SOLUTION INTRAMUSCULAR; INTRAVENOUS EVERY 5 MIN PRN
Status: CANCELLED | OUTPATIENT
Start: 2023-06-09

## 2023-06-09 RX ORDER — SODIUM CHLORIDE 9 MG/ML
INJECTION, SOLUTION INTRAVENOUS PRN
Status: CANCELLED | OUTPATIENT
Start: 2023-06-09

## 2023-06-09 RX ORDER — DEXAMETHASONE SODIUM PHOSPHATE 10 MG/ML
INJECTION INTRAMUSCULAR; INTRAVENOUS PRN
Status: DISCONTINUED | OUTPATIENT
Start: 2023-06-09 | End: 2023-06-09 | Stop reason: SDUPTHER

## 2023-06-09 RX ORDER — PHENAZOPYRIDINE HYDROCHLORIDE 200 MG/1
200 TABLET, FILM COATED ORAL 3 TIMES DAILY PRN
Qty: 15 TABLET | Refills: 0 | Status: SHIPPED | OUTPATIENT
Start: 2023-06-09 | End: 2023-06-14

## 2023-06-09 RX ORDER — SODIUM CHLORIDE 0.9 % (FLUSH) 0.9 %
5-40 SYRINGE (ML) INJECTION PRN
Status: CANCELLED | OUTPATIENT
Start: 2023-06-09

## 2023-06-09 RX ORDER — HYOSCYAMINE SULFATE 0.12 MG/1
0.12 TABLET SUBLINGUAL EVERY 4 HOURS PRN
Qty: 20 EACH | Refills: 0 | Status: SHIPPED | OUTPATIENT
Start: 2023-06-09

## 2023-06-09 RX ORDER — FENTANYL CITRATE 50 UG/ML
INJECTION, SOLUTION INTRAMUSCULAR; INTRAVENOUS PRN
Status: DISCONTINUED | OUTPATIENT
Start: 2023-06-09 | End: 2023-06-09 | Stop reason: SDUPTHER

## 2023-06-09 RX ORDER — CIPROFLOXACIN 500 MG/1
500 TABLET, FILM COATED ORAL 2 TIMES DAILY
Qty: 6 TABLET | Refills: 0 | Status: SHIPPED | OUTPATIENT
Start: 2023-06-09 | End: 2023-06-12

## 2023-06-09 RX ORDER — SODIUM CHLORIDE, SODIUM LACTATE, POTASSIUM CHLORIDE, CALCIUM CHLORIDE 600; 310; 30; 20 MG/100ML; MG/100ML; MG/100ML; MG/100ML
INJECTION, SOLUTION INTRAVENOUS CONTINUOUS
Status: DISCONTINUED | OUTPATIENT
Start: 2023-06-09 | End: 2023-06-09 | Stop reason: HOSPADM

## 2023-06-09 RX ORDER — MAGNESIUM HYDROXIDE 1200 MG/15ML
LIQUID ORAL CONTINUOUS PRN
Status: DISCONTINUED | OUTPATIENT
Start: 2023-06-09 | End: 2023-06-09 | Stop reason: HOSPADM

## 2023-06-09 RX ORDER — ONDANSETRON 2 MG/ML
INJECTION INTRAMUSCULAR; INTRAVENOUS PRN
Status: DISCONTINUED | OUTPATIENT
Start: 2023-06-09 | End: 2023-06-09 | Stop reason: SDUPTHER

## 2023-06-09 RX ORDER — ONDANSETRON 2 MG/ML
4 INJECTION INTRAMUSCULAR; INTRAVENOUS
Status: CANCELLED | OUTPATIENT
Start: 2023-06-09 | End: 2023-06-10

## 2023-06-09 RX ADMIN — KETOROLAC TROMETHAMINE 15 MG: 30 INJECTION, SOLUTION INTRAMUSCULAR; INTRAVENOUS at 18:47

## 2023-06-09 RX ADMIN — SODIUM CHLORIDE, POTASSIUM CHLORIDE, SODIUM LACTATE AND CALCIUM CHLORIDE: 600; 310; 30; 20 INJECTION, SOLUTION INTRAVENOUS at 15:13

## 2023-06-09 RX ADMIN — LIDOCAINE HYDROCHLORIDE 50 MG: 10 INJECTION, SOLUTION EPIDURAL; INFILTRATION; INTRACAUDAL; PERINEURAL at 18:22

## 2023-06-09 RX ADMIN — DEXAMETHASONE SODIUM PHOSPHATE 4 MG: 10 INJECTION INTRAMUSCULAR; INTRAVENOUS at 18:27

## 2023-06-09 RX ADMIN — Medication 2000 MG: at 18:30

## 2023-06-09 RX ADMIN — FENTANYL CITRATE 25 MCG: 50 INJECTION, SOLUTION INTRAMUSCULAR; INTRAVENOUS at 18:42

## 2023-06-09 RX ADMIN — PROPOFOL 200 MG: 10 INJECTION, EMULSION INTRAVENOUS at 18:22

## 2023-06-09 RX ADMIN — FENTANYL CITRATE 25 MCG: 50 INJECTION, SOLUTION INTRAMUSCULAR; INTRAVENOUS at 18:33

## 2023-06-09 RX ADMIN — FENTANYL CITRATE 50 MCG: 50 INJECTION, SOLUTION INTRAMUSCULAR; INTRAVENOUS at 18:22

## 2023-06-09 RX ADMIN — ONDANSETRON 4 MG: 2 INJECTION INTRAMUSCULAR; INTRAVENOUS at 18:47

## 2023-06-09 ASSESSMENT — PAIN SCALES - GENERAL
PAINLEVEL_OUTOF10: 0
PAINLEVEL_OUTOF10: 0

## 2023-06-09 ASSESSMENT — PAIN - FUNCTIONAL ASSESSMENT: PAIN_FUNCTIONAL_ASSESSMENT: 0-10

## 2023-06-09 NOTE — H&P
[facility]  Urology History & Physical      Patient:  Artemus Gitelman  MRN: [id number]  YOB: 1955    CHIEF COMPLAINT:  right ureteral stone     HISTORY OF PRESENT ILLNESS:   The patient is a 75 y.o. male with obstructing 5mm R ureteral stone. Patient's old records, notes and chart reviewed and summarized above. Past Medical History:    Past Medical History:   Diagnosis Date    COVID-19 01/2021    Loss of taste. No treatment or hospitalization. Diabetes mellitus ([address]     type 2/ [place]agar[place] PA/ [facility]/ last seen 5-2023    Hyperlipidemia     Kidney stone     right    Pain     right lower ABD    Wears prescription eyeglasses     Wellness examination 07/01/2022    PCP Milton Willingham MD/ [facility]/ last seen 4-2023       Past Surgical History:    Past Surgical History:   Procedure Laterality Date    COLONOSCOPY      CYSTOSCOPY W/ LASER LITHOTRIPSY Left 07/28/2022    FINGER TRIGGER RELEASE Bilateral     TONSILLECTOMY      URETER SURGERY Left 07/28/2022    HOLMIUM LASER LITHOTRIPSY, CYSTOSCOPY,  URETEROSCOPY,  STENT PLACEMENT performed by [place] MD at [place] [address]       Medications:      Current Facility-Administered Medications:     lactated ringers IV soln infusion, , IntraVENous, Continuous, Hubert Rivera MD, Last Rate: 100 mL/hr at 06/09/23 1513, New Bag at 06/09/23 1513    Allergies:     Allergies   Allergen Reactions    Pcn [Penicillins]      Unsure of reaction    Sulfa Antibiotics      Unsure of reaction       Social History:   Social History     Socioeconomic History    Marital status:      Spouse name: Not on file    Number of children: Not on file    Years of education: Not on file    Highest education level: Not on file   Occupational History    Not on file   Tobacco Use    Smoking status: Never     Passive exposure: Never    Smokeless tobacco: Never   Vaping Use    Vaping Use: Never used   Substance and Sexual Activity    Alcohol use:

## 2023-06-09 NOTE — OP NOTE
Operative Note      Patient: Albert Patricia  YOB: 1955  MRN: [id number]    Date of Procedure: 6/9/2023    Pre-Op Diagnosis Codes:     * Kidney stone [N20.0]    Post-Op Diagnosis: Same       Procedures:  Cystoscopy  Right ureteroscopy with holmium laser lithotripsy  Right ureteral stent placement    Surgeon(s):  Lulu Knutson MD    Assistant:   Resident: Dwayne Patten MD    Anesthesia: General    Estimated Blood Loss (mL): Minimal    Complications: None    Specimens:   * No specimens in log *    Implants:  Implant Name Type Inv. Item Serial No.  Lot No. LRB No. Used Action   STENT URET 6FR L26CM PERCFLX HYDR+ DBL PGTL THRD 2 - [id number]  STENT URET 6FR L26CM PERCFLX HYDR+ DBL PGTL THRD 2  [employer]-  Right 1 Implanted         Drains: * No LDAs found *    Findings: Right stones within kidney, fragmented well. 6Fr x 26cm      Detailed Description of Procedure: The patient was transferred from the preoperative holding area to the operative suite in supine position. He was transferred to the operating table and general anesthesia was induced. Prior to general anesthesia, EPC cuffs were on and running. Preoperative antibiotics were given in the form of Ancef, 2 g. The patient was then transferred to dorsolithotomy position. He was prepped and draped in normal sterile fashion, timeout ensued confirming the correct patient and correct procedure being performed. We began by inserting a cystoscope with 22 [place] sheath and 30 degree lens into the bladder with ease, we focused our attention on the right ureteral orifice which we cannulated with a Glidewire, accessing the renal pelvis under fluoroscopy. We then used a dual-lumen catheter to place a second wire. Over our wire, we placed a flexible disposable ureteroscope up into the renal pelvis under direct vision. On right ureteroscopy, there were no stones or damage to the ureter noted.   Once in the renal pelvis, full pyeloscopy

## 2023-06-09 NOTE — ANESTHESIA PRE PROCEDURE
AM    BILITOT 0.4 06/07/2023 03:24 AM    ALKPHOS 89 06/07/2023 03:24 AM    AST 21 06/07/2023 03:24 AM    ALT 18 06/07/2023 03:24 AM       BMP    Lab Results   Component Value Date/Time     06/07/2023 03:24 AM    K 4.7 06/07/2023 03:24 AM     06/07/2023 03:24 AM    CO2 27 06/07/2023 03:24 AM    BUN 16 06/07/2023 03:24 AM    CREATININE 0.80 06/07/2023 03:24 AM    CALCIUM 10.0 06/07/2023 03:24 AM    GFRAA >60 07/23/2022 05:15 AM    LABGLOM >60 06/07/2023 03:24 AM    GLUCOSE 134 06/07/2023 03:24 AM       POC Testing  Recent Labs     06/09/23  1510   POCGLU 88       Coags  No results found for: PROTIME, INR, APTT    HCG (If Applicable) No results found for: PREGTESTUR, PREGSERUM, HCG, HCGQUANT     ABGs No results found for: PHART, PO2ART, ZLF1VKN, OGC7WJA, BEART, J1IPLZPY     Type & Screen (If Applicable)  No results found for: [facility]    Radiology (If Applicable)    Cardiac Testing (If Applicable)     EKG (If Applicable) IRBBB,? Inf MI            Vital Signs (Current): There were no vitals filed for this visit.                                            BP Readings from Last 3 Encounters:   06/07/23 127/74   07/28/22 (!) 140/72   07/23/22 (!) 155/91       NPO Status:  mn                                                                               BMI:   Wt Readings from Last 3 Encounters:   06/08/23 233 lb (105.7 kg)   06/07/23 233 lb (105.7 kg)   07/28/22 270 lb (122.5 kg)     There is no height or weight on file to calculate BMI.    CBC:   Lab Results   Component Value Date/Time    WBC 10.8 06/07/2023 03:24 AM    RBC 5.08 06/07/2023 03:24 AM    HGB 14.4 06/07/2023 03:24 AM    HCT 43.9 06/07/2023 03:24 AM    MCV 86.4 06/07/2023 03:24 AM    RDW 14.8 06/07/2023 03:24 AM     06/07/2023 03:24 AM       CMP:   Lab Results   Component Value Date/Time     06/07/2023 03:24 AM    K 4.7 06/07/2023 03:24 AM     06/07/2023 03:24 AM    CO2 27 06/07/2023 03:24 AM    BUN 16 06/07/2023 03:24 AM

## 2023-06-09 NOTE — DISCHARGE INSTRUCTIONS
Ureteral Stent Information  -Ureteral stents are hollow tubes with multiple side holes that coils in your kidney and proceeds down your ureter where it then coils in your bladder                              -Most stents are temporary, if you have a chronic  stent it will need to be exchanged regularly. If left in longer than recommended, serious   complications of severe encrustation, UTI,   and/or obstruction and potential loss of kidney can occur    -Ureteral stents are used to relieve ureteral obstruction and promote ureteral healing following surgery    Why you may have a Stent:  -Ureteral obstruction secondary to kidney stones, ureteral stenosis, ureteral anastomosis, or preventative (prior to ESWL for large stone)    Stent Symptoms  You may not have any symptoms at all   Irritating voiding symptoms; urgency, frequency, feeling of incomplete bladder emptying, burning, blood in urine for up to 1-3 days, straining (all likely due to stent irritating the bladder)  Suprapubic pressure/discomfort  Flank pain    Stent Management  -You may be discharged home with:  Pain medication- take as needed for severe pain  Anticholinergic (levsin, Oxybutynin, Urispas)- These medications will decrease ureteral and bladder spasms that are likely causing discomfort  Flomax-relaxes ureter  Antibiotic-treats or prevents infection-take medication until completed  *Take all medications as prescribed    *If pain is severe take pain pill, take a hot shower for 10-15 minutes, and then apply heating pad to affected area      -Diet  Normal diet,         Increase water intake!!!! Try to consume at least 2 liters of water a day  AVOID Caffeine-this can make symptoms worse!  -Activity  Avoid strenuous activity!!   Rest when tired  Do not drive if taking narcotic pain medicine  *Call provider if developing symptoms of infection; fever, chills, pus in your urine, new onset body aches    Follow-up is Key part of treatment and safety!!!

## 2023-06-10 LAB
EKG ATRIAL RATE: 80 BPM
EKG P AXIS: 46 DEGREES
EKG P-R INTERVAL: 156 MS
EKG Q-T INTERVAL: 386 MS
EKG QRS DURATION: 92 MS
EKG QTC CALCULATION (BAZETT): 445 MS
EKG R AXIS: 1 DEGREES
EKG T AXIS: 24 DEGREES
EKG VENTRICULAR RATE: 80 BPM

## 2023-06-12 NOTE — ANESTHESIA POSTPROCEDURE EVALUATION
Department of Anesthesiology  Postprocedure Note    Patient: Darryl Ventura  MRN: [id number]  YOB: 1955  Date of evaluation: 6/12/2023      Procedure Summary     Date: 06/09/23 Room / Location: [address]    Anesthesia Start: 1817 Anesthesia Stop: 5880    Procedure: HOLMIUM LASER CYSTOSCOPY, URETEROSCOPY, STENT PLACEMENT (Right) Diagnosis:       Kidney stone      (RIGHT KIDNEY STONE)    Surgeons: Paulo Parham MD Responsible Provider: Samanta De Souza MD    Anesthesia Type: general ASA Status: 3          Anesthesia Type: No value filed.     Carla Phase I: Carla Score: 10    Carla Phase II: Carla Score: 10      Anesthesia Post Evaluation    Patient location during evaluation: PACU  Patient participation: complete - patient participated  Level of consciousness: awake and alert  Pain score: 0  Airway patency: patent  Nausea & Vomiting: no vomiting and no nausea  Complications: no  Cardiovascular status: hemodynamically stable  Respiratory status: acceptable  Hydration status: stable

## 2024-01-07 ENCOUNTER — HOSPITAL ENCOUNTER (OUTPATIENT)
Dept: GENERAL RADIOLOGY | Facility: CLINIC | Age: 69
Discharge: HOME | End: 2024-01-09
Payer: MEDICARE

## 2024-01-07 ENCOUNTER — HOSPITAL ENCOUNTER (OUTPATIENT)
Facility: CLINIC | Age: 69
Discharge: HOME | End: 2024-01-09
Payer: MEDICARE

## 2024-01-07 DIAGNOSIS — N20.0 CALCULUS OF KIDNEY: ICD-10-CM

## 2024-01-07 PROCEDURE — 74018 RADEX ABDOMEN 1 VIEW: CPT

## 2024-01-19 ENCOUNTER — HOSPITAL ENCOUNTER (OUTPATIENT)
Facility: CLINIC | Age: 69
Discharge: HOME | End: 2024-01-19
Payer: MEDICARE

## 2024-01-19 LAB
BACTERIA URNS QL MICRO: NORMAL
CASTS #/AREA URNS LPF: NORMAL /LPF (ref 0–8)
EPI CELLS #/AREA URNS HPF: NORMAL /HPF (ref 0–5)
RBC #/AREA URNS HPF: NORMAL /HPF (ref 0–4)
WBC #/AREA URNS HPF: NORMAL /HPF (ref 0–5)

## 2024-01-19 PROCEDURE — 36415 COLL VENOUS BLD VENIPUNCTURE: CPT

## 2024-01-19 PROCEDURE — 80048 BASIC METABOLIC PNL TOTAL CA: CPT

## 2024-01-19 PROCEDURE — 81015 MICROSCOPIC EXAM OF URINE: CPT

## 2024-01-20 LAB
ANION GAP SERPL CALCULATED.3IONS-SCNC: 11 MMOL/L (ref 9–17)
ANION GAP SERPL CALCULATED.3IONS-SCNC: 11 MMOL/L (ref 9–17)
BUN SERPL-MCNC: 16 MG/DL (ref 8–23)
BUN SERPL-MCNC: 16 MG/DL (ref 8–23)
CALCIUM SERPL-MCNC: 9.9 MG/DL (ref 8.6–10.4)
CALCIUM SERPL-MCNC: 9.9 MG/DL (ref 8.6–10.4)
CHLORIDE SERPL-SCNC: 105 MMOL/L (ref 98–107)
CHLORIDE SERPL-SCNC: 105 MMOL/L (ref 98–107)
CO2 SERPL-SCNC: 27 MMOL/L (ref 20–31)
CO2 SERPL-SCNC: 27 MMOL/L (ref 20–31)
CREAT SERPL-MCNC: 1.1 MG/DL (ref 0.7–1.2)
CREAT SERPL-MCNC: 1.1 MG/DL (ref 0.7–1.2)
GFR SERPL CREATININE-BSD FRML MDRD: >60 ML/MIN/1.73M2
GFR SERPL CREATININE-BSD FRML MDRD: >60 ML/MIN/1.73M2
GLUCOSE SERPL-MCNC: 105 MG/DL (ref 70–99)
GLUCOSE SERPL-MCNC: 105 MG/DL (ref 70–99)
POTASSIUM SERPL-SCNC: 4.9 MMOL/L (ref 3.7–5.3)
POTASSIUM SERPL-SCNC: 4.9 MMOL/L (ref 3.7–5.3)
SODIUM SERPL-SCNC: 143 MMOL/L (ref 135–144)
SODIUM SERPL-SCNC: 143 MMOL/L (ref 135–144)

## 2024-04-18 ENCOUNTER — APPOINTMENT (OUTPATIENT)
Dept: GENERAL RADIOLOGY | Facility: CLINIC | Age: 69
End: 2024-04-18
Attending: EMERGENCY MEDICINE
Payer: MEDICARE

## 2024-04-18 ENCOUNTER — HOSPITAL ENCOUNTER (EMERGENCY)
Facility: CLINIC | Age: 69
Discharge: HOME | End: 2024-04-18
Attending: EMERGENCY MEDICINE
Payer: MEDICARE

## 2024-04-18 VITALS
HEART RATE: 66 BPM | RESPIRATION RATE: 16 BRPM | HEIGHT: 70 IN | SYSTOLIC BLOOD PRESSURE: 152 MMHG | BODY MASS INDEX: 33.21 KG/M2 | DIASTOLIC BLOOD PRESSURE: 77 MMHG | OXYGEN SATURATION: 100 % | WEIGHT: 232 LBS | TEMPERATURE: 98.5 F

## 2024-04-18 DIAGNOSIS — S29.9XXA TRAUMATIC INJURY OF RIB: Primary | ICD-10-CM

## 2024-04-18 PROCEDURE — 99283 EMERGENCY DEPT VISIT LOW MDM: CPT

## 2024-04-18 PROCEDURE — 71046 X-RAY EXAM CHEST 2 VIEWS: CPT

## 2024-04-18 RX ORDER — LIDOCAINE 4 G/G
1 PATCH TOPICAL DAILY
Qty: 30 PATCH | Refills: 0 | Status: SHIPPED | OUTPATIENT
Start: 2024-04-18 | End: 2024-05-18

## 2024-04-18 ASSESSMENT — LIFESTYLE VARIABLES
HOW MANY STANDARD DRINKS CONTAINING ALCOHOL DO YOU HAVE ON A TYPICAL DAY: PATIENT DOES NOT DRINK
HOW OFTEN DO YOU HAVE A DRINK CONTAINING ALCOHOL: NEVER

## 2024-04-19 NOTE — DISCHARGE INSTR - COC
Discharging to Facility/ Agency   Name:   Address:  Phone:  Fax:    Dialysis Facility (if applicable)   Name:  Address:  Dialysis Schedule:  Phone:  Fax:    / signature: {Esignature:[id number]}    PHYSICIAN SECTION    Prognosis: {Prognosis:5131431194}    Condition at Discharge: { Patient Condition:455992160}    Rehab Potential (if transferring to Rehab): {Prognosis:1133875603}    Recommended Labs or Other Treatments After Discharge: ***    Physician Certification: I certify the above information and transfer of Tashi German  is necessary for the continuing treatment of the diagnosis listed and that he requires {Admit to Appropriate Level of Care:65915} for {GREATER/LESS:713590507} 30 days.     Update Admission H&P: {CHP DME Changes in HandP:391338127}    PHYSICIAN SIGNATURE:  {Esignature:[id number]}

## 2024-04-19 NOTE — DISCHARGE INSTRUCTIONS
Call today or tomorrow to follow up with Christian Snider MD  in 2-3 days.    Take your medication as prescribed for pain.  Use the incentive spirometer - every hour while awake.  Do not use any rib binders / ace wraps around your chest.    Take your medication as indicated and prescribed.  For pain use acetaminophen (Tylenol) or ibuprofen (Motrin / Advil), unless prescribed medications that have acetaminophen or ibuprofen (or similar medications) in it.  You can take over the counter acetaminophen tablets (1 - 2 tablets of the 500-mg strength every 6 hours) or ibuprofen tablets (2 tablets every 4 hours).    Use an ice pack or bag filled with ice and apply to the injured area 3 - 4 times a day for 15 - 20 minutes each time.  If the injury is older than 3 days, then use a heating pad to help relax the muscles.    PLEASE RETURN TO THE EMERGENCY DEPARTMENT IMMEDIATELY for worsening of pain, decrease sensation to arms or legs, inability to move arms or legs, shortness of breath, severe chest pain, excessive nausea or vomiting, notice any bruising to your abdomen or have increase in abdominal pain, or if you develop any concerning symptoms such as: high fever not relieved by acetaminophen (Tylenol) and/or ibuprofen (Motrin / Advil), chills, feeling of your heart fluttering or racing, persistent nausea and/or vomiting, vomiting up blood, blood in your stool, loss of consciousness, numbness, weakness or tingling in the arms or legs or change in color of the extremities, changes in mental status, persistent headache, blurry vision, loss of bladder / bowel control, unable to follow up with your physician, or other any other care or concern.

## 2024-04-19 NOTE — ED PROVIDER NOTES
He has never been exposed to tobacco smoke. He has never used smokeless tobacco. He reports that he does not drink alcohol and does not use drugs.    PHYSICAL EXAM     INITIAL VITALS:  height is 1.778 m (5' 10\") and weight is 105.2 kg (232 lb). His oral temperature is 98.5 °F (36.9 °C). His blood pressure is 152/77 (abnormal) and his pulse is 66. His respiration is 16 and oxygen saturation is 100%.       CONSTITUTIONAL: no apparent distress, well appearing  SKIN: warm, dry, no jaundice, hives or petechiae  EYES: clear conjunctiva, non-icteric sclera  HENT: normocephalic, atraumatic, moist mucus membranes  NECK: Nontender and supple with no nuchal rigidity, full range of motion  PULMONARY: clear to auscultation without wheezes, rhonchi, or rales, normal excursion, no accessory muscle use and no stridor  CARDIOVASCULAR: regular rate, rhythm. Strong radial pulses with intact distal perfusion. Capillary refill <2 seconds.  GASTROINTESTINAL: soft, non-tender, non-distended, no palpable masses, no rebound or guarding   GENITOURINARY: No costovertebral angle tenderness to palpation  MUSCULOSKELETAL: Mild tenderness to palpation over the right side chest wall without any overlying erythema, edema, ecchymosis, crepitus or flail chest.  No midline spinal tenderness, step off or deformity. Extremities are otherwise nontender to palpation and nonerythematous. Compartments soft. No peripheral edema.  NEUROLOGIC: alert and oriented x 3, GCS 15, normal mentation and speech. Moves all extremities x 4 without motor or sensory deficit, gait is stable without ataxia  PSYCHIATRIC: normal mood and affect, thought process is clear and linear    DIAGNOSTIC RESULTS     EKG:  None    RADIOLOGY:   XR CHEST (2 VW)    Result Date: 4/19/2024  EXAMINATION: TWO XRAY VIEWS OF THE CHEST 4/18/2024 11:06 pm COMPARISON: None. HISTORY: ORDERING SYSTEM PROVIDED HISTORY: right rib injury TECHNOLOGIST PROVIDED HISTORY: right rib injury Reason for Exam:

## 2024-08-09 ENCOUNTER — HOSPITAL ENCOUNTER (OUTPATIENT)
Dept: GENERAL RADIOLOGY | Facility: CLINIC | Age: 69
End: 2024-08-09
Payer: MEDICARE

## 2024-08-09 ENCOUNTER — HOSPITAL ENCOUNTER (OUTPATIENT)
Facility: CLINIC | Age: 69
End: 2024-08-09
Payer: MEDICARE

## 2024-08-09 DIAGNOSIS — N20.0 KIDNEY STONES: ICD-10-CM

## 2024-08-09 PROCEDURE — 74018 RADEX ABDOMEN 1 VIEW: CPT

## 2025-06-03 ENCOUNTER — HOSPITAL ENCOUNTER (EMERGENCY)
Facility: CLINIC | Age: 70
Discharge: HOME | End: 2025-06-03
Attending: EMERGENCY MEDICINE
Payer: MEDICARE

## 2025-06-03 VITALS
WEIGHT: 237 LBS | HEIGHT: 71 IN | TEMPERATURE: 98.4 F | SYSTOLIC BLOOD PRESSURE: 156 MMHG | RESPIRATION RATE: 20 BRPM | BODY MASS INDEX: 33.18 KG/M2 | OXYGEN SATURATION: 96 % | HEART RATE: 96 BPM | DIASTOLIC BLOOD PRESSURE: 69 MMHG

## 2025-06-03 DIAGNOSIS — S01.01XA LACERATION OF SCALP, INITIAL ENCOUNTER: Primary | ICD-10-CM

## 2025-06-03 PROCEDURE — 12002 RPR S/N/AX/GEN/TRNK2.6-7.5CM: CPT

## 2025-06-03 PROCEDURE — 99284 EMERGENCY DEPT VISIT MOD MDM: CPT

## 2025-06-03 PROCEDURE — 90715 TDAP VACCINE 7 YRS/> IM: CPT | Performed by: EMERGENCY MEDICINE

## 2025-06-03 PROCEDURE — 6360000002 HC RX W HCPCS: Performed by: EMERGENCY MEDICINE

## 2025-06-03 PROCEDURE — 90471 IMMUNIZATION ADMIN: CPT | Performed by: EMERGENCY MEDICINE

## 2025-06-03 RX ADMIN — TETANUS TOXOID, REDUCED DIPHTHERIA TOXOID AND ACELLULAR PERTUSSIS VACCINE, ADSORBED 0.5 ML: 5; 2.5; 8; 8; 2.5 SUSPENSION INTRAMUSCULAR at 22:03

## 2025-06-03 ASSESSMENT — PAIN - FUNCTIONAL ASSESSMENT: PAIN_FUNCTIONAL_ASSESSMENT: NONE - DENIES PAIN

## 2025-06-04 ASSESSMENT — ENCOUNTER SYMPTOMS
EYE DISCHARGE: 0
VOMITING: 0
ABDOMINAL PAIN: 0
COLOR CHANGE: 0
STRIDOR: 0
WHEEZING: 0
NAUSEA: 0
CONSTIPATION: 0
SHORTNESS OF BREATH: 0
EYE REDNESS: 0
COUGH: 0
EYE PAIN: 0
DIARRHEA: 0
SORE THROAT: 0

## 2025-06-04 NOTE — ED PROVIDER NOTES
[facility]  EMERGENCY DEPARTMENT ENCOUNTER      Pt Name: Tashi German  MRN: [id number]  Birthdate 1955  Date of evaluation: 6/3/2025  Provider: Yin Ames MD    CHIEF COMPLAINT       Chief Complaint   Patient presents with    Head Injury    Laceration     Pt states fell off last step of ladder and hit back of head on plastic. Lac to back of head. Bleeding controlled. Physician at site cleansed site and instructed to ED for evaluation. No LOC.      HISTORY OF PRESENT ILLNESS  (Location/Symptom, Timing/Onset, Context/Setting, Quality, Duration, Modifying Factors, Severity.)   Tashi German is a 69 y.o. male who presents to the emergency department complaining of falling off a ladder this am at 11.  He worked the rest of the day and then came in for laceration repair.  He doesn't believe his tetanus is up to date.  He denies any pain or dizziness.  No neck or back pain.   No tingling or numbness to the extremities.  He denies being on any blood thinners.    Nursing Notes were reviewed.    REVIEW OF SYSTEMS    (2-9 systems for level 4, 10 or more for level 5)     Review of Systems   Constitutional:  Negative for activity change, appetite change, chills, fatigue and fever.   HENT:  Negative for congestion, ear pain and sore throat.    Eyes:  Negative for pain, discharge and redness.   Respiratory:  Negative for cough, shortness of breath, wheezing and stridor.    Cardiovascular:  Negative for chest pain.   Gastrointestinal:  Negative for abdominal pain, constipation, diarrhea, nausea and vomiting.   Genitourinary:  Negative for decreased urine volume and difficulty urinating.   Musculoskeletal:  Negative for arthralgias and myalgias.   Skin:  Positive for wound. Negative for color change and rash.   Neurological:  Negative for dizziness, weakness and headaches.   Psychiatric/Behavioral:  Negative for behavioral problems and confusion.        Except as noted above the remainder

## 2025-07-28 ENCOUNTER — TRANSCRIBE ORDERS (OUTPATIENT)
Dept: EMERGENCY DEPT | Facility: CLINIC | Age: 70
End: 2025-07-28

## 2025-07-28 ENCOUNTER — HOSPITAL ENCOUNTER (OUTPATIENT)
Dept: GENERAL RADIOLOGY | Facility: CLINIC | Age: 70
Discharge: HOME | End: 2025-07-30
Payer: MEDICARE

## 2025-07-28 DIAGNOSIS — N20.0 URIC ACID NEPHROLITHIASIS: ICD-10-CM

## 2025-07-28 DIAGNOSIS — N20.0 URIC ACID NEPHROLITHIASIS: Primary | ICD-10-CM

## 2025-07-28 PROCEDURE — 74018 RADEX ABDOMEN 1 VIEW: CPT

## (undated) DEVICE — PACK PROCEDURE SURG CYSTO SVMMC LF

## (undated) DEVICE — DRAPE,REIN 53X77,STERILE: Brand: MEDLINE

## (undated) DEVICE — GLOVE SURG SZ 65 THK91MIL LTX FREE SYN POLYISOPRENE

## (undated) DEVICE — GUIDEWIRE URO L150CM DIA0.035IN STIFF NIT HYDRPHLC STR TIP

## (undated) DEVICE — NITINOL STONE RETRIEVAL BASKET: Brand: ZERO TIP

## (undated) DEVICE — GARMENT,MEDLINE,DVT,INT,CALF,MED, GEN2: Brand: MEDLINE

## (undated) DEVICE — STRIP,CLOSURE,WOUND,MEDI-STRIP,1/2X4: Brand: MEDLINE

## (undated) DEVICE — 3M™ STERI-STRIP™ COMPOUND BENZOIN TINCTURE 40 BAGS/CARTON 4 CARTONS/CASE C1544: Brand: 3M™ STERI-STRIP™

## (undated) DEVICE — DUAL LUMEN URETERAL CATHETER

## (undated) DEVICE — SINGLE ACTION PUMPING SYSTEM

## (undated) DEVICE — ADAPTER URO SCP UROLOK LL

## (undated) DEVICE — FIBER LSR FLEXIVA PULSE TRACTIP 242 BOX